# Patient Record
Sex: MALE | Race: WHITE | Employment: OTHER | URBAN - NONMETROPOLITAN AREA
[De-identification: names, ages, dates, MRNs, and addresses within clinical notes are randomized per-mention and may not be internally consistent; named-entity substitution may affect disease eponyms.]

---

## 2018-02-14 PROBLEM — G40.919 BREAKTHROUGH SEIZURE (HCC): Status: ACTIVE | Noted: 2018-02-14

## 2018-10-01 ENCOUNTER — HOSPITAL ENCOUNTER (EMERGENCY)
Age: 53
Discharge: HOME | End: 2018-10-01
Attending: EMERGENCY MEDICINE
Payer: COMMERCIAL

## 2018-10-01 VITALS
OXYGEN SATURATION: 99 % | HEART RATE: 55 BPM | RESPIRATION RATE: 16 BRPM | BODY MASS INDEX: 22.9 KG/M2 | WEIGHT: 160 LBS | TEMPERATURE: 98.4 F | DIASTOLIC BLOOD PRESSURE: 72 MMHG | HEIGHT: 70 IN | SYSTOLIC BLOOD PRESSURE: 131 MMHG

## 2018-10-01 DIAGNOSIS — S01.111A EYEBROW LACERATION, RIGHT, INITIAL ENCOUNTER: Primary | ICD-10-CM

## 2018-10-01 PROCEDURE — 4500000027

## 2018-10-01 PROCEDURE — 99283 EMERGENCY DEPT VISIT LOW MDM: CPT

## 2018-10-01 RX ORDER — FLUOXETINE HYDROCHLORIDE 20 MG/1
20 CAPSULE ORAL DAILY
COMMUNITY

## 2018-10-01 RX ORDER — AMOXICILLIN 500 MG
1 CAPSULE ORAL DAILY
COMMUNITY
End: 2022-01-12

## 2018-10-01 NOTE — ED PROVIDER NOTES
times daily (Patient taking differently: Take 500 mg by mouth 2 times daily ) 180 tablet 0    Multiple Vitamins-Minerals (MULTIVITAMIN MEN 50+) TABS Take 1 tablet by mouth daily      vitamin B-12 (CYANOCOBALAMIN) 500 MCG tablet Take 500 mcg by mouth daily      b complex vitamins capsule Take 1 capsule by mouth daily      lacosamide (VIMPAT) 200 MG tablet 1 tablet by PEG Tube route 2 times daily. (Patient taking differently: Take 200 mg by mouth 2 times daily ) 60 tablet 0     Allergies   Allergen Reactions    Zithromax [Azithromycin] Other (See Comments)     Pt. POA says it affected his kidneys but could not recall how      Advair Diskus [Fluticasone-Salmeterol] Other (See Comments)     Mother states causes SIEZURES    Albuterol Sulfate Other (See Comments)     Mother states it causes SIEZURES    Amoxicillin     Doxycycline     Lasix [Furosemide] Rash         ROS:    Review of Systems   Musculoskeletal: Negative for neck stiffness. Skin: Positive for wound. All other systems reviewed and are negative. Nursing Notes Reviewed    Physical Exam:  ED Triage Vitals   Enc Vitals Group      BP       Pulse       Resp       Temp       Temp src       SpO2       Weight       Height       Head Circumference       Peak Flow       Pain Score       Pain Loc       Pain Edu? Excl. in [address]? Physical Exam   Constitutional: He is oriented to person, place, and time. He appears well-developed and well-nourished. HENT:   Head: Normocephalic and atraumatic. Laceration right eyebrow 3 cm linear no devitalized tissue no foreign body   Eyes: Pupils are equal, round, and reactive to light. Neck: Normal range of motion. Neck supple. Cardiovascular: Normal rate, normal heart sounds and intact distal pulses. Pulmonary/Chest: Breath sounds normal. No respiratory distress. Abdominal: Soft. There is no tenderness. Musculoskeletal: Normal range of motion.         Cervical back: Normal. He exhibits normal range of motion, no tenderness and no bony tenderness. Neurological: He is alert and oriented to person, place, and time. Skin: Skin is warm and dry. Psychiatric: He has a normal mood and affect. Nursing note and vitals reviewed. I have reviewed and interpreted all of the currently available lab results from this visit (if applicable):  No results found for this visit on 10/01/18. Radiographs (if obtained):  [] The following radiograph was interpreted by myself in the absence of a radiologist:   [] Radiologist's Report Reviewed:  No orders to display         EKG (if obtained): (All EKG's are interpreted by myself in the absence of a cardiologist)    Chart review shows recent radiographs:  No results found. MDM:    Procedure Note - Laceration repair:  Questions were sought and answered and verbal consent was given by patient for the procedure. The area was prepped and draped in standard bedside fashion. The wound area was anesthetized with 3ml of Lidocaine 1% with epinephrine with added sodium bicarbonate. The wound was explored with No foreign bodies found. The wound was repaired with 6-0 Prolene; 3  sutures were used. The patient tolerated the procedure well without complications and my repeat neurovascular exam post-procedure is unchanged. Wound care and scar minimization education was provided. Instructions were given to return for increasing pain, redness, streaking, discharge, or any other worsening or worrisome concerns. Clinical Impression:  1. Eyebrow laceration, right, initial encounter      Disposition referral (if applicable):  No follow-up provider specified. Disposition medications (if applicable):  New Prescriptions    No medications on file           Bridger Mccollum DO, FACEP      Comment: Please note this report has been produced using speech recognition software and may contain errors related to that system including errors in grammar, punctuation, and spelling, as

## 2018-10-16 ENCOUNTER — APPOINTMENT (OUTPATIENT)
Dept: GENERAL RADIOLOGY | Age: 53
End: 2018-10-16
Payer: COMMERCIAL

## 2018-10-16 ENCOUNTER — APPOINTMENT (OUTPATIENT)
Dept: CT IMAGING | Age: 53
End: 2018-10-16
Payer: COMMERCIAL

## 2018-10-16 ENCOUNTER — HOSPITAL ENCOUNTER (EMERGENCY)
Age: 53
Discharge: HOME | End: 2018-10-16
Attending: EMERGENCY MEDICINE
Payer: COMMERCIAL

## 2018-10-16 VITALS
SYSTOLIC BLOOD PRESSURE: 141 MMHG | HEIGHT: 67 IN | RESPIRATION RATE: 16 BRPM | OXYGEN SATURATION: 83 % | WEIGHT: 160 LBS | TEMPERATURE: 98.3 F | BODY MASS INDEX: 25.11 KG/M2 | DIASTOLIC BLOOD PRESSURE: 84 MMHG | HEART RATE: 55 BPM

## 2018-10-16 DIAGNOSIS — R07.89 CHEST WALL PAIN: ICD-10-CM
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
## 2018-10-16 PROCEDURE — 99284 EMERGENCY DEPT VISIT MOD MDM: CPT

## 2018-10-16 PROCEDURE — 72125 CT NECK SPINE W/O DYE: CPT

## 2018-10-16 PROCEDURE — 70450 CT HEAD/BRAIN W/O DYE: CPT

## 2018-10-16 PROCEDURE — 71046 X-RAY EXAM CHEST 2 VIEWS: CPT

## 2018-10-16 ASSESSMENT — PAIN DESCRIPTION - PAIN TYPE: TYPE: ACUTE PAIN

## 2018-10-16 ASSESSMENT — PAIN DESCRIPTION - DESCRIPTORS: DESCRIPTORS: ACHING

## 2018-10-16 ASSESSMENT — PAIN SCALES - WONG BAKER: WONGBAKER_NUMERICALRESPONSE: 4

## 2018-10-16 ASSESSMENT — PAIN DESCRIPTION - LOCATION: LOCATION: BACK

## 2018-12-23 ENCOUNTER — APPOINTMENT (OUTPATIENT)
Dept: CT IMAGING | Age: 53
End: 2018-12-23
Payer: COMMERCIAL

## 2018-12-23 ENCOUNTER — HOSPITAL ENCOUNTER (EMERGENCY)
Age: 53
Discharge: HOME | End: 2018-12-23
Attending: EMERGENCY MEDICINE
Payer: COMMERCIAL

## 2018-12-23 VITALS
DIASTOLIC BLOOD PRESSURE: 78 MMHG | TEMPERATURE: 97.6 F | OXYGEN SATURATION: 99 % | BODY MASS INDEX: 22.87 KG/M2 | SYSTOLIC BLOOD PRESSURE: 133 MMHG | RESPIRATION RATE: 18 BRPM | HEART RATE: 79 BPM | WEIGHT: 146 LBS

## 2018-12-23 DIAGNOSIS — R56.9 SEIZURE (HCC): Primary | ICD-10-CM

## 2018-12-23 LAB
ALBUMIN SERPL-MCNC: 4 GM/DL (ref 3.4–5)
ALP BLD-CCNC: 65 IU/L (ref 40–129)
ALT SERPL-CCNC: 17 U/L (ref 10–40)
ANION GAP SERPL CALCULATED.3IONS-SCNC: 13 MMOL/L (ref 4–16)
AST SERPL-CCNC: 20 IU/L (ref 15–37)
BACTERIA: ABNORMAL /HPF
BASOPHILS ABSOLUTE: 0.1 K/CU MM
BASOPHILS RELATIVE PERCENT: 0.7 % (ref 0–1)
BILIRUB SERPL-MCNC: 0.2 MG/DL (ref 0–1)
BILIRUBIN URINE: NEGATIVE MG/DL
BLOOD, URINE: NEGATIVE
BUN BLDV-MCNC: 23 MG/DL (ref 6–23)
CALCIUM SERPL-MCNC: 9.4 MG/DL (ref 8.3–10.6)
CHLORIDE BLD-SCNC: 100 MMOL/L (ref 99–110)
CLARITY: CLEAR
CO2: 26 MMOL/L (ref 21–32)
COLOR: YELLOW
CREAT SERPL-MCNC: 0.9 MG/DL (ref 0.9–1.3)
DIFFERENTIAL TYPE: ABNORMAL
EOSINOPHILS ABSOLUTE: 0.1 K/CU MM
EOSINOPHILS RELATIVE PERCENT: 0.8 % (ref 0–3)
GFR AFRICAN AMERICAN: >60 ML/MIN/1.73M2
GFR NON-AFRICAN AMERICAN: >60 ML/MIN/1.73M2
GLUCOSE BLD-MCNC: 81 MG/DL (ref 70–99)
GLUCOSE, URINE: NEGATIVE MG/DL
HCT VFR BLD CALC: 41.5 % (ref 42–52)
HEMOGLOBIN: 13.3 GM/DL (ref 13.5–18)
IMMATURE NEUTROPHIL %: 0.5 % (ref 0–0.43)
KETONES, URINE: NEGATIVE MG/DL
LACTATE: 2.8 MMOL/L (ref 0.4–2)
LEUKOCYTE ESTERASE, URINE: NEGATIVE
LYMPHOCYTES ABSOLUTE: 1.4 K/CU MM
LYMPHOCYTES RELATIVE PERCENT: 19.4 % (ref 24–44)
MAGNESIUM: 2 MG/DL (ref 1.8–2.4)
MCH RBC QN AUTO: 29.4 PG (ref 27–31)
MCHC RBC AUTO-ENTMCNC: 32 % (ref 32–36)
MCV RBC AUTO: 91.8 FL (ref 78–100)
MONOCYTES ABSOLUTE: 0.6 K/CU MM
MONOCYTES RELATIVE PERCENT: 8.6 % (ref 0–4)
MUCUS: ABNORMAL HPF
NITRITE URINE, QUANTITATIVE: NEGATIVE
NUCLEATED RBC %: 0 %
PDW BLD-RTO: 14.2 % (ref 11.7–14.9)
PH, URINE: 7 (ref 5–8)
PLATELET # BLD: 265 K/CU MM (ref 140–440)
PMV BLD AUTO: 9.6 FL (ref 7.5–11.1)
POTASSIUM SERPL-SCNC: 4.3 MMOL/L (ref 3.5–5.1)
PROTEIN UA: 30 MG/DL
RBC # BLD: 4.52 M/CU MM (ref 4.6–6.2)
RBC URINE: 4 /HPF (ref 0–3)
SEGMENTED NEUTROPHILS ABSOLUTE COUNT: 5.1 K/CU MM
SEGMENTED NEUTROPHILS RELATIVE PERCENT: 70 % (ref 36–66)
SODIUM BLD-SCNC: 139 MMOL/L (ref 135–145)
SPECIFIC GRAVITY UA: 1.02 (ref 1–1.03)
SPERM: ABNORMAL /HFP
SQUAMOUS EPITHELIAL: <1 /HPF
TOTAL CK: 104 IU/L (ref 38–174)
TOTAL IMMATURE NEUTOROPHIL: 0.04 K/CU MM
TOTAL NUCLEATED RBC: 0 K/CU MM
TOTAL PROTEIN: 7.6 GM/DL (ref 6.4–8.2)
TRICHOMONAS: ABNORMAL /HPF
TSH HIGH SENSITIVITY: 8.12 UIU/ML (ref 0.27–4.2)
UROBILINOGEN, URINE: NORMAL MG/DL (ref 0.2–1)
VALPROIC ACID LEVEL: 62.2 UG/ML (ref 50–100)
WBC # BLD: 7.3 K/CU MM (ref 4–10.5)
WBC UA: <1 /HPF (ref 0–2)

## 2018-12-23 PROCEDURE — 2580000003 HC RX 258: Performed by: EMERGENCY MEDICINE

## 2018-12-23 PROCEDURE — 99284 EMERGENCY DEPT VISIT MOD MDM: CPT

## 2018-12-23 PROCEDURE — 93005 ELECTROCARDIOGRAM TRACING: CPT | Performed by: EMERGENCY MEDICINE

## 2018-12-23 PROCEDURE — 80164 ASSAY DIPROPYLACETIC ACD TOT: CPT

## 2018-12-23 PROCEDURE — 70450 CT HEAD/BRAIN W/O DYE: CPT

## 2018-12-23 PROCEDURE — 93010 ELECTROCARDIOGRAM REPORT: CPT | Performed by: INTERNAL MEDICINE

## 2018-12-23 PROCEDURE — 83735 ASSAY OF MAGNESIUM: CPT

## 2018-12-23 PROCEDURE — 85025 COMPLETE CBC W/AUTO DIFF WBC: CPT

## 2018-12-23 PROCEDURE — 84443 ASSAY THYROID STIM HORMONE: CPT

## 2018-12-23 PROCEDURE — 82550 ASSAY OF CK (CPK): CPT

## 2018-12-23 PROCEDURE — 80053 COMPREHEN METABOLIC PANEL: CPT

## 2018-12-23 PROCEDURE — 83605 ASSAY OF LACTIC ACID: CPT

## 2018-12-23 PROCEDURE — 81001 URINALYSIS AUTO W/SCOPE: CPT

## 2018-12-23 RX ORDER — 0.9 % SODIUM CHLORIDE 0.9 %
1000 INTRAVENOUS SOLUTION INTRAVENOUS ONCE
Status: COMPLETED | OUTPATIENT
Start: 2018-12-23 | End: 2018-12-23

## 2018-12-23 RX ADMIN — SODIUM CHLORIDE 1000 ML: 9 INJECTION, SOLUTION INTRAVENOUS at 07:10

## 2018-12-23 ASSESSMENT — ENCOUNTER SYMPTOMS
GASTROINTESTINAL NEGATIVE: 1
RESPIRATORY NEGATIVE: 1
EYES NEGATIVE: 1
ALLERGIC/IMMUNOLOGIC NEGATIVE: 1

## 2018-12-23 NOTE — ED NOTES
Discharge instructions and follow up care reviewed with the patient and parents. Questions addressed. [facility]  12/23/18 3077

## 2018-12-23 NOTE — ED PROVIDER NOTES
brain    EKG (if obtained): (All EKG's are interpreted by myself in the absence of a cardiologist)    12 lead EKG per my interpretation:  Normal Sinus Rhythm 78  Axis is   Normal  QTc is  389  There is no specific T wave changes appreciated. There is no specific ST wave changes appreciated. Prior EKG to compare with was not available       MDM:    Patient isn't complaining of seizure. Again he has a history of seizures does get breakthrough seizures she's had 3 since last night lasting about a minute each generalized tonic-clonic. He appears well my examination vital signs are stable he has no complaints denies any signs of trauma. Labs are negative imaging is negative. Patient does take Vimpat and Depakote. I did order a Depakote level back today. He does see neurology. I advised very strict return precautions and follow-up information. Family patient okay going home discharge given instructions. EKG negative.     CLINICAL IMPRESSION:  Final diagnoses:   Seizure ([address]       (Please note that portions of this note may have been completed with a voice recognition program. Efforts were made to edit the dictations but occasionally words aremis-transcribed.)    DISPOSITION REFERRAL (if applicable):  Sabrina Jordan MD  [address] [place] [id number]  [phone]    In 1 day      [facility] Emergency Department  [place] [id number] [phone]    If symptoms worsen    Nicole Lozoya MD  [facility] [address] [place] [address]  [phone]    Schedule an appointment as soon as possible for a visit in 1 day        DISPOSITION MEDICATIONS (if applicable):  New Prescriptions    No medications on file          Walter Rizo, [facility],   12/23/18 4974

## 2018-12-23 NOTE — ED NOTES
This nurse spoke with Dr Jeannie Vásquez about IV fluids. IV fluids stopped due to discharge. Only 100ml infused. Janet Alcantara, [facility]  12/23/18 3242

## 2018-12-28 LAB
EKG ATRIAL RATE: 78 BPM
EKG DIAGNOSIS: NORMAL
EKG P AXIS: 61 DEGREES
EKG P-R INTERVAL: 150 MS
EKG Q-T INTERVAL: 342 MS
EKG QRS DURATION: 86 MS
EKG QTC CALCULATION (BAZETT): 389 MS
EKG R AXIS: 42 DEGREES
EKG T AXIS: 72 DEGREES
EKG VENTRICULAR RATE: 78 BPM

## 2019-05-20 ENCOUNTER — HOSPITAL ENCOUNTER (EMERGENCY)
Age: 54
Discharge: HOME | End: 2019-05-20
Attending: EMERGENCY MEDICINE
Payer: COMMERCIAL

## 2019-05-20 VITALS
RESPIRATION RATE: 20 BRPM | WEIGHT: 150 LBS | OXYGEN SATURATION: 95 % | HEART RATE: 67 BPM | DIASTOLIC BLOOD PRESSURE: 83 MMHG | TEMPERATURE: 96.7 F | BODY MASS INDEX: 22.73 KG/M2 | HEIGHT: 68 IN | SYSTOLIC BLOOD PRESSURE: 134 MMHG

## 2019-05-20 DIAGNOSIS — S01.81XA FACIAL LACERATION, INITIAL ENCOUNTER: Primary | ICD-10-CM

## 2019-05-20 PROCEDURE — 99283 EMERGENCY DEPT VISIT LOW MDM: CPT

## 2019-05-20 PROCEDURE — 4500000027

## 2019-05-20 PROCEDURE — 6370000000 HC RX 637 (ALT 250 FOR IP)

## 2019-05-20 RX ORDER — LACOSAMIDE 200 MG/1
TABLET ORAL
COMMUNITY
End: 2019-05-20 | Stop reason: ALTCHOICE

## 2019-05-20 RX ORDER — DIAPER,BRIEF,INFANT-TODD,DISP
EACH MISCELLANEOUS
Status: COMPLETED
Start: 2019-05-20 | End: 2019-05-20

## 2019-05-20 RX ORDER — DONEPEZIL HYDROCHLORIDE 5 MG/1
5 TABLET, FILM COATED ORAL NIGHTLY
COMMUNITY
End: 2022-01-12

## 2019-05-20 RX ADMIN — BACITRACIN ZINC 1 G: 500 OINTMENT TOPICAL at 16:00
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
## 2019-05-20 NOTE — ED NOTES
Laceration sutured by Dr Jesse Arrington. Pt tolerated well.  Male friend remained at bedside     Becca Honeycutt, [facility]  05/20/19 5819

## 2019-05-20 NOTE — ED NOTES
Discharge instructions given to pt and male friend. Instructed to follow up with his family dr and to return to ER if any problems or concerns. Male friend verbalizes understanding.  Pt discharged ambulatory with male friend     Promise Nguyen RN  05/20/19 5898

## 2019-06-23 ENCOUNTER — ANESTHESIA EVENT (OUTPATIENT)
Dept: OPERATING ROOM | Age: 54
End: 2019-06-23
Payer: MEDICARE

## 2019-06-23 ASSESSMENT — COPD QUESTIONNAIRES: CAT_SEVERITY: MODERATE
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
## 2019-06-24 NOTE — PROGRESS NOTES
1. Hx of anesthesia complications? --no  2. Hx of difficult airway/intubation? --no  3. Hx of changed chest pain/CHF exacerbation in last 6 mo. ? --no  4. Home O2 dependent? --no  5. Able to climb one flight of stairs w/o SOB? -- yes  6.  Recent COPD exacerbation or pneumonia/bronchitis that has been treated in last      month? --no

## 2019-06-25 ENCOUNTER — HOSPITAL ENCOUNTER (OUTPATIENT)
Age: 54
Setting detail: OUTPATIENT SURGERY
Discharge: HOME | End: 2019-06-25
Attending: INTERNAL MEDICINE | Admitting: INTERNAL MEDICINE
Payer: MEDICARE

## 2019-06-25 ENCOUNTER — ANESTHESIA (OUTPATIENT)
Dept: OPERATING ROOM | Age: 54
End: 2019-06-25
Payer: MEDICARE

## 2019-06-25 VITALS
RESPIRATION RATE: 16 BRPM | DIASTOLIC BLOOD PRESSURE: 83 MMHG | TEMPERATURE: 97.2 F | OXYGEN SATURATION: 97 % | WEIGHT: 165 LBS | SYSTOLIC BLOOD PRESSURE: 114 MMHG | HEIGHT: 66 IN | BODY MASS INDEX: 26.52 KG/M2 | HEART RATE: 58 BPM

## 2019-06-25 VITALS — OXYGEN SATURATION: 95 % | DIASTOLIC BLOOD PRESSURE: 95 MMHG | SYSTOLIC BLOOD PRESSURE: 122 MMHG

## 2019-06-25 PROCEDURE — 3700000001 HC ADD 15 MINUTES (ANESTHESIA): Performed by: INTERNAL MEDICINE

## 2019-06-25 PROCEDURE — 6360000002 HC RX W HCPCS: Performed by: NURSE ANESTHETIST, CERTIFIED REGISTERED

## 2019-06-25 PROCEDURE — 3700000000 HC ANESTHESIA ATTENDED CARE: Performed by: INTERNAL MEDICINE

## 2019-06-25 PROCEDURE — 2500000003 HC RX 250 WO HCPCS: Performed by: NURSE ANESTHETIST, CERTIFIED REGISTERED

## 2019-06-25 PROCEDURE — 7100000010 HC PHASE II RECOVERY - FIRST 15 MIN: Performed by: INTERNAL MEDICINE

## 2019-06-25 PROCEDURE — 2709999900 HC NON-CHARGEABLE SUPPLY: Performed by: INTERNAL MEDICINE

## 2019-06-25 PROCEDURE — 2580000003 HC RX 258: Performed by: INTERNAL MEDICINE

## 2019-06-25 PROCEDURE — 7100000011 HC PHASE II RECOVERY - ADDTL 15 MIN: Performed by: INTERNAL MEDICINE

## 2019-06-25 PROCEDURE — 3609012800 HC EGD DIAGNOSTIC ONLY: Performed by: INTERNAL MEDICINE

## 2019-06-25 RX ORDER — SODIUM CHLORIDE, SODIUM LACTATE, POTASSIUM CHLORIDE, CALCIUM CHLORIDE 600; 310; 30; 20 MG/100ML; MG/100ML; MG/100ML; MG/100ML
INJECTION, SOLUTION INTRAVENOUS CONTINUOUS
Status: DISCONTINUED | OUTPATIENT
Start: 2019-06-25 | End: 2019-06-25 | Stop reason: HOSPADM

## 2019-06-25 RX ORDER — LIDOCAINE HYDROCHLORIDE 20 MG/ML
INJECTION, SOLUTION EPIDURAL; INFILTRATION; INTRACAUDAL; PERINEURAL PRN
Status: DISCONTINUED | OUTPATIENT
Start: 2019-06-25 | End: 2019-06-25 | Stop reason: SDUPTHER

## 2019-06-25 RX ORDER — PROPOFOL 10 MG/ML
INJECTION, EMULSION INTRAVENOUS PRN
Status: DISCONTINUED | OUTPATIENT
Start: 2019-06-25 | End: 2019-06-25 | Stop reason: SDUPTHER

## 2019-06-25 RX ADMIN — SODIUM CHLORIDE, POTASSIUM CHLORIDE, SODIUM LACTATE AND CALCIUM CHLORIDE: 600; 310; 30; 20 INJECTION, SOLUTION INTRAVENOUS at 07:32

## 2019-06-25 RX ADMIN — PROPOFOL 180 MG: 10 INJECTION, EMULSION INTRAVENOUS at 08:02

## 2019-06-25 RX ADMIN — LIDOCAINE HYDROCHLORIDE 120 MG: 20 INJECTION, SOLUTION EPIDURAL; INFILTRATION; INTRACAUDAL; PERINEURAL at 08:02

## 2019-06-25 ASSESSMENT — PAIN SCALES - GENERAL
PAINLEVEL_OUTOF10: 0
PAINLEVEL_OUTOF10: 0

## 2019-06-25 ASSESSMENT — PAIN - FUNCTIONAL ASSESSMENT: PAIN_FUNCTIONAL_ASSESSMENT: 0-10

## 2019-06-25 NOTE — BRIEF OP NOTE
Brief Postoperative Note  ______________________________________________________________    Patient: Jing Worley  YOB: 1965  MRN: [id number]  Date of Procedure: 6/25/2019    Pre-Op Diagnosis: Dysphagia, Hiatal Hernia    Post-Op Diagnosis: Same no stricture       Procedure(s):  EGD DIAGNOSTIC ONLY    Anesthesia: General, Monitor Anesthesia Care, TIVA    Surgeon(s):  Irving Munguia MD    Estimated Blood Loss (mL): less than 50     Complications: None                    Irving Munguia MD  Date: 6/25/2019  Time: 8:13 AM

## 2019-06-25 NOTE — PROGRESS NOTES
7463 Patient transferred from GI lab to [facility] via cart, his father is at bedside, patient is resting quietly with his eyes closed. 7965 Patient is wake and is sitting up drinking a pepsi. 0840 Patient and his father given home instructions. 0900 Patient discharged to home, his father will drive him.

## 2019-06-25 NOTE — ANESTHESIA POSTPROCEDURE EVALUATION
Department of Anesthesiology  Postprocedure Note    Patient: Ellen Troy  MRN: [id number]  YOB: 1965  Date of evaluation: 6/25/2019  Time:  8:14 AM     Procedure Summary     Date:  06/25/19 Room / Location:  [place] [address] 04 / 1200 [facility] OR    Anesthesia Start:  1994 Anesthesia Stop:  7276    Procedure:  EGD DIAGNOSTIC ONLY (N/A ) Diagnosis:  (Dysphagia, Hiatal Hernia)    Surgeon:  Edmundo Pisano MD Responsible Provider:  Balinda Lesches, APRN - CRNA    Anesthesia Type:  general, MAC, TIVA ASA Status:  3          Anesthesia Type: general, MAC, TIVA    Nilesh Phase I:      Nilesh Phase II:      Last vitals: Reviewed and per EMR flowsheets.        Anesthesia Post Evaluation    Patient location during evaluation: bedside  Patient participation: complete - patient participated  Level of consciousness: awake and alert  Pain score: 0  Airway patency: patent  Nausea & Vomiting: no nausea and no vomiting  Complications: no  Cardiovascular status: blood pressure returned to baseline  Respiratory status: acceptable, nonlabored ventilation, spontaneous ventilation and room air  Hydration status: euvolemic

## 2019-10-25 ENCOUNTER — APPOINTMENT (OUTPATIENT)
Dept: CT IMAGING | Age: 54
End: 2019-10-25
Payer: MEDICARE

## 2019-10-25 ENCOUNTER — HOSPITAL ENCOUNTER (EMERGENCY)
Age: 54
Discharge: HOME | End: 2019-10-25
Attending: EMERGENCY MEDICINE
Payer: MEDICARE

## 2019-10-25 VITALS
SYSTOLIC BLOOD PRESSURE: 138 MMHG | TEMPERATURE: 97.1 F | HEART RATE: 78 BPM | WEIGHT: 162 LBS | OXYGEN SATURATION: 99 % | BODY MASS INDEX: 26.03 KG/M2 | DIASTOLIC BLOOD PRESSURE: 70 MMHG | HEIGHT: 66 IN | RESPIRATION RATE: 25 BRPM

## 2019-10-25 DIAGNOSIS — R56.9 SEIZURE (HCC): Primary | ICD-10-CM

## 2019-10-25 LAB
ALBUMIN SERPL-MCNC: 4.1 GM/DL (ref 3.4–5)
ALP BLD-CCNC: 54 IU/L (ref 40–129)
ALT SERPL-CCNC: 19 U/L (ref 10–40)
ANION GAP SERPL CALCULATED.3IONS-SCNC: 11 MMOL/L (ref 4–16)
AST SERPL-CCNC: 23 IU/L (ref 15–37)
BASOPHILS ABSOLUTE: 0.1 K/CU MM
BASOPHILS RELATIVE PERCENT: 0.4 % (ref 0–1)
BILIRUB SERPL-MCNC: 0.2 MG/DL (ref 0–1)
BUN BLDV-MCNC: 21 MG/DL (ref 6–23)
CALCIUM SERPL-MCNC: 9.6 MG/DL (ref 8.3–10.6)
CHLORIDE BLD-SCNC: 97 MMOL/L (ref 99–110)
CO2: 27 MMOL/L (ref 21–32)
CREAT SERPL-MCNC: 0.9 MG/DL (ref 0.9–1.3)
DIFFERENTIAL TYPE: ABNORMAL
EOSINOPHILS ABSOLUTE: 0 K/CU MM
EOSINOPHILS RELATIVE PERCENT: 0 % (ref 0–3)
GFR AFRICAN AMERICAN: >60 ML/MIN/1.73M2
GFR NON-AFRICAN AMERICAN: >60 ML/MIN/1.73M2
GLUCOSE BLD-MCNC: 105 MG/DL (ref 70–99)
HCT VFR BLD CALC: 44.2 % (ref 42–52)
HEMOGLOBIN: 14.4 GM/DL (ref 13.5–18)
IMMATURE NEUTROPHIL %: 0.3 % (ref 0–0.43)
LYMPHOCYTES ABSOLUTE: 1.7 K/CU MM
LYMPHOCYTES RELATIVE PERCENT: 12.4 % (ref 24–44)
MCH RBC QN AUTO: 30.3 PG (ref 27–31)
MCHC RBC AUTO-ENTMCNC: 32.6 % (ref 32–36)
MCV RBC AUTO: 92.9 FL (ref 78–100)
MONOCYTES ABSOLUTE: 1 K/CU MM
MONOCYTES RELATIVE PERCENT: 7.5 % (ref 0–4)
NUCLEATED RBC %: 0 %
PDW BLD-RTO: 13.2 % (ref 11.7–14.9)
PLATELET # BLD: 290 K/CU MM (ref 140–440)
PMV BLD AUTO: 9.9 FL (ref 7.5–11.1)
POTASSIUM SERPL-SCNC: 3.9 MMOL/L (ref 3.5–5.1)
RBC # BLD: 4.76 M/CU MM (ref 4.6–6.2)
SEGMENTED NEUTROPHILS ABSOLUTE COUNT: 10.6 K/CU MM
SEGMENTED NEUTROPHILS RELATIVE PERCENT: 79.4 % (ref 36–66)
SODIUM BLD-SCNC: 135 MMOL/L (ref 135–145)
TOTAL IMMATURE NEUTOROPHIL: 0.04 K/CU MM
TOTAL NUCLEATED RBC: 0 K/CU MM
TOTAL PROTEIN: 7.4 GM/DL (ref 6.4–8.2)
WBC # BLD: 13.4 K/CU MM (ref 4–10.5)

## 2019-10-25 PROCEDURE — 85025 COMPLETE CBC W/AUTO DIFF WBC: CPT

## 2019-10-25 PROCEDURE — 36415 COLL VENOUS BLD VENIPUNCTURE: CPT

## 2019-10-25 PROCEDURE — 6360000002 HC RX W HCPCS: Performed by: EMERGENCY MEDICINE

## 2019-10-25 PROCEDURE — 99284 EMERGENCY DEPT VISIT MOD MDM: CPT

## 2019-10-25 PROCEDURE — 96374 THER/PROPH/DIAG INJ IV PUSH: CPT

## 2019-10-25 PROCEDURE — 80165 DIPROPYLACETIC ACID FREE: CPT

## 2019-10-25 PROCEDURE — 70450 CT HEAD/BRAIN W/O DYE: CPT

## 2019-10-25 PROCEDURE — 80164 ASSAY DIPROPYLACETIC ACD TOT: CPT

## 2019-10-25 PROCEDURE — 80053 COMPREHEN METABOLIC PANEL: CPT

## 2019-10-25 RX ORDER — LORAZEPAM 2 MG/ML
1 INJECTION INTRAMUSCULAR ONCE
Status: COMPLETED | OUTPATIENT
Start: 2019-10-25 | End: 2019-10-25

## 2019-10-25 RX ORDER — DIVALPROEX SODIUM 250 MG/1
250 TABLET, EXTENDED RELEASE ORAL ONCE
Status: DISCONTINUED | OUTPATIENT
Start: 2019-10-25 | End: 2019-10-25

## 2019-10-25 RX ADMIN — LORAZEPAM 1 MG: 2 INJECTION, SOLUTION INTRAMUSCULAR; INTRAVENOUS at 17:18

## 2019-10-28 LAB
VALPROIC ACID % FREE: 20 % (ref 5–18)
VALPROIC ACID % FREE: ABNORMAL % (ref 5–18)
VALPROIC ACID TOTAL: 69 UG/ML (ref 50–125)
VALPROIC ACID, FREE: 14 UG/ML (ref 7–23)

## 2020-09-28 ENCOUNTER — HOSPITAL ENCOUNTER (OUTPATIENT)
Dept: SPEECH THERAPY | Age: 55
Setting detail: THERAPIES SERIES
Discharge: HOME | End: 2020-09-28
Payer: MEDICARE

## 2020-09-28 PROCEDURE — 92523 SPEECH SOUND LANG COMPREHEN: CPT
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
## 2020-09-28 NOTE — PLAN OF CARE
skills for functional daily information for everyday activities             Frequency/Duration:  # Days per week: [x] 1 day # Weeks: [] 1 week [] 5 weeks     [] 2 days? [] 2 weeks [] 6 weeks     [] 3 days   [] 3 weeks [] 7 weeks     [] 4 days   [] 4 weeks [x] 8 weeks         [] 9 weeks [] 10 weeks         [] 11 weeks [] 12 weeks    Rehab Potential: [] Excellent [x] Good [] Fair  [] Poor         Electronically signed by:   Adolfo Arredondo. MS Asad, CCC-SLP  Speech/Language Pathologist  9/28/2020  4:05 PM    If you have any questions or concerns, please don't hesitate to call.   Thank you for your referral.      Physician Signature:__________________Date:___________ Time: __________  By signing above, therapists plan is approved by physician

## 2020-09-29 NOTE — FLOWSHEET NOTE
Patients Plan of Care was received and signed. Signed POC was scanned and placed in the patients chart.     Eveline Schultz

## 2020-10-05 ENCOUNTER — HOSPITAL ENCOUNTER (OUTPATIENT)
Dept: SPEECH THERAPY | Age: 55
Setting detail: THERAPIES SERIES
Discharge: HOME | End: 2020-10-05
Payer: MEDICARE

## 2020-10-05 PROCEDURE — 92507 TX SP LANG VOICE COMM INDIV: CPT

## 2020-10-05 NOTE — FLOWSHEET NOTE
[x][facility] [address]      [facility] [address] Dept          Outpatient Rehab Dept     [address]       [address], [address], [address]       [address]     [phone] [phone] [phone]  [][place] [address]           Outpatient Speech Dept. [address] [facility], [address] [facility]           [phone] [phone]    Speech and Language Pathology Daily Note      Patient Name:  Angella Shipman    :  1965  Restrictions/Precautions:    Diagnosis/ICD 10 Dx Code from Physician:  Poor short term memory   Treatment Diagnosis/ICD10 for ST: cognitive communication deficit N41.002  Insurance/Certification information: BC/BS [facility]  Referring Physician:    Ashly Bartholomew PA-C     Plan of care signed (Y/N):  yes       Treatment Provided: speech    Date 10-5-2020    Time in/Time out 11:32AM-12:02PM    Total Timed Code Min 0    Total Treatment Minutes 30 minutes    Units  G Code applied: 1 speech    Subjective     Alert and cooperative    Pain level: No pain    Visit# / total visits:      GOALS:     Goal 1: Will employ a communication board system to establish topic when Reji Camacho is unable to retrieval the message he wishes to communicate. Milton's mother had written down the target vocabulary that Lulu Rodriguez uses each day. She also selected 31 vocabulary words/pictures from a communication symbols/word book that this therapist provided to her during this session. Goal 2: His mother will demonstrate use of the Spaced Retrieval Strategy to facilitate short term memory skills for functional daily information for everyday activities  The [place] Cognitive Assessment (MoCA)  Version 7.1 was administered 10/5/2020 as a rapid screening instrument for cognitive dysfunction.  It assesses the cognitive domains of  attention and concentration, executive functions, memory, language, visuoconstructional skills, conceptual thinking, calculations, and orientation. Yessy Paredes attained a score of 3 which indicates a  severe cognitive impairment. He demonstrated the ability to draw a [place] in the clock drawing portion and he stated [place]\" for the city in which he lives. He received no points for the trail making task, naming, memory, attention, language (repetition of sentences, word fluency), abstraction and delayed recall. He was unable to state the remaining aspects of orientation and write the numbers and hands on the clock. Did not address the Spaced Retrieval Strategy today                   Effective Strategies that Improve fx: Use of memory strategies to facilitate recall and use of cues to facilitate word retrieval skills    Barriers to progress: Cognitive impairments    Education completed:     Goals of treatment, plan of care, development of communication book and results of MOCA were discussed with Milton's mother who verbalized understanding    Home Exercise Plan: Mother will continue to write down vocabulary words that are meaningful for Yessy Paredes so that they can be included in print and picture in the communication book.       Objective Findings:  See above    Interventions used this date:  [x] Speech Treatment       [x] Instruction in HEP  [] Group   [] Dysphagia Treatment   [] Cognitive Skill Darren  [] Motor  [] Voice Treatment       []  AAC  Other:      Communication with other providers: none    Adverse Reactions to treatment: none     Treatment/Activity Tolerance:     [x]  Patient tolerated treatment well []  Patient limited by fatique    []  Patient limited by pain []  Patient limited by other medical complications   []  Other:     Patient Requires Follow-up:  []  Yes  []  No    Plan: [x]  Continue per plan of care []  Alter current plan (see comments)   [x]  Plan of care initiated []  Hold pending MD visit [] Discharge    Plan for Next Session:  Continue plan of care    Electronically signed by:   Orlando Corona.  MS Asad, CCC-SLP  Speech/Language Pathologist  10/5/2020  12:39 PM

## 2020-10-12 ENCOUNTER — HOSPITAL ENCOUNTER (OUTPATIENT)
Dept: SPEECH THERAPY | Age: 55
Setting detail: THERAPIES SERIES
Discharge: HOME | End: 2020-10-12
Payer: MEDICARE

## 2020-10-12 PROCEDURE — 92507 TX SP LANG VOICE COMM INDIV: CPT

## 2020-10-12 NOTE — FLOWSHEET NOTE
[x][facility] [address]      [facility] [address] Dept          Outpatient Rehab Dept     [address]       [facility], [address], [address]       [address]     [phone] [phone] [phone]  [][facility] [address]           Outpatient Speech Dept. [address] [address]           [phone] [phone]    Speech and Language Pathology Daily Note      Patient Name:  Keisha Jarvis    :  1965  Restrictions/Precautions:    Diagnosis/ICD 10 Dx Code from Physician:  Poor short term memory   Treatment Diagnosis/ICD10 for ST: cognitive communication deficit F19.348  Insurance/Certification information: BC/SAJAN [facility]- 10 sessions authorized  Referring Physician:    Bruna Ruiz PA-C     Plan of care signed (Y/N):  yes       Treatment Provided: speech    Date 10-5-2020 10-   Time in/Time out 11:32AM-12:02PM 11:17-11:54AM   Total Timed Code Min 0  0   Total Treatment Minutes 30 minutes 37 minutes   Units  G Code applied: 1 speech 1 speech   Subjective     Alert and cooperative Alert and cooperative    Pain level: No pain No pain   Visit# / total visits:   2   GOALS:     Goal 1: Will employ a communication board system to establish topic when Tracey Liu is unable to retrieval the message he wishes to communicate. Milton's mother had written down the target vocabulary that Kaylah Sung uses each day. She also selected 31 vocabulary words/pictures from a communication symbols/word book that this therapist provided to her during this session. Provided Neo's mother with pictures of some of the target vocabulary that she identified as functional for Filiberto conklin. She had assembled functional vocabulary in photos and/or color pictures into the booklet provided by this therapist last week.  She reported that she has been using this book with Mauricio Quiles to establish topic and to talk about topics of interest to him. She indicated that the communication book has been helpful for communication with him. Provided instruction regarding use of a one page tool that has vocabulary that are related such as toothbrush, toothpaste, razor,bathroom arranged close together on the page. She reported that she will use the vocabulary pictures to assemble a tool for communication. Eduard generated several sentences during this session when he saw the target pictures. Informed his mother that enhanced communication is the intent of the communication book. Goal 2: His mother will demonstrate use of the Spaced Retrieval Strategy to facilitate short term memory skills for functional daily information for everyday activities  The [place] Cognitive Assessment (MoCA)  Version 7.1 was administered 10/12/2020 as a rapid screening instrument for cognitive dysfunction. It assesses the cognitive domains of  attention and concentration, executive functions, memory, language, visuoconstructional skills, conceptual thinking, calculations, and orientation. Kaylah Sung attained a score of 3 which indicates a  severe cognitive impairment. He demonstrated the ability to draw a [place] in the clock drawing portion and he stated [place]\" for the city in which he lives. He received no points for the trail making task, naming, memory, attention, language (repetition of sentences, word fluency), abstraction and delayed recall. He was unable to state the remaining aspects of orientation and write the numbers and hands on the clock. Did not address the Spaced Retrieval Strategy today                Provided instruction and a handout regarding the Spaced Retrieval Strategy. Within the session, Tracey Liu was able to generate with cues, a task that he was going to assist his mother with after this session- \"take recycles to bin\".  At the one minute and two minute intervals he was able to recall the task. His mother indicated that she will employ this strategy during the next week to increase recall of functional information. Effective Strategies that Improve fx: Use of memory strategies to facilitate recall and use of cues to facilitate word retrieval skills Use of memory strategies to facilitate recall and use of cues to facilitate word retrieval skills. Use of communication book to establish topic and facilitate word retrieval   Barriers to progress: Cognitive impairments Cognitive impairments and hearing loss   Education completed:     Goals of treatment, plan of care, development of communication book and results of MOCA were discussed with Milton's mother who verbalized understanding Goals of treatment, plan of care, development and use of communication book were discussed with Jocelyn Latonya and his mother who verbalized understanding   Home Exercise Plan: Mother will continue to write down vocabulary words that are meaningful for Juan Cummings so that they can be included in print and picture in the communication book. Cedric's mother will employ the communication book developed today with Jocelyn Hanks and add functional vocabulary to the [employer] and book.      Objective Findings:  See above    Interventions used this date:  [x] Speech Treatment       [x] Instruction in HEP  [] Group   [] Dysphagia Treatment   [] Cognitive Skill Darren  [] Motor  [] Voice Treatment       []  AAC  Other:      Communication with other providers: none    Adverse Reactions to treatment: none     Treatment/Activity Tolerance:     [x]  Patient tolerated treatment well []  Patient limited by fatique    []  Patient limited by pain []  Patient limited by other medical complications   []  Other:     Patient Requires Follow-up:  []  Yes  []  No    Plan: [x]  Continue per plan of care []  Alter current plan (see comments)   [x]  Plan of care initiated []  Hold pending MD visit []  Discharge    Plan for Next Session:  Continue plan of care    Electronically signed by:   Gertrude Ruiz.  MS Asad, CCC-SLP  Speech/Language Pathologist  10/12/2020  12:18 PM

## 2020-10-19 ENCOUNTER — HOSPITAL ENCOUNTER (OUTPATIENT)
Dept: SPEECH THERAPY | Age: 55
Setting detail: THERAPIES SERIES
Discharge: HOME | End: 2020-10-19
Payer: MEDICARE

## 2020-10-19 PROCEDURE — 92507 TX SP LANG VOICE COMM INDIV: CPT

## 2020-10-19 NOTE — FLOWSHEET NOTE
[x][facility] Shawnarânio Junqueira 1460      [place] [facility] [address] Dept          Outpatient Rehab Dept     [address]       [facility] [place] [address], [address], [address]       Maria G Marroquin 61     [phone] [phone] [phone]  [][facility] Duckworthio Junqueira 1460           Outpatient Speech Dept. [address] [facility], [address] [facility],Third Floor           [phone] [phone]    Speech and Language Pathology Daily Note      Patient Name:  Horacio Feliz    :  1965  Restrictions/Precautions:    Diagnosis/ICD 10 Dx Code from Physician:  Poor short term memory   Treatment Diagnosis/ICD10 for ST: cognitive communication deficit F12.346  Insurance/Certification information: BC[facility]SAJAN Manning[facility]  Referring Physician:    Mayra Burns PA-C     Plan of care signed (Y/N):  yes       Treatment Provided: speech    Date 10-5-2020 10- 10-   Time in/Time out 11:32AM-12:02PM 11:17-11:54AM 11:33-12:00PM   Total Timed Code Min 0  0 0   Total Treatment Minutes 30 minutes 37 minutes   27 minutes   Units  G Code applied: 1 speech 1 speech 1 speech   Subjective     Alert and cooperative Alert and cooperative  Alert and cooperative   Pain level: No pain No pain No pain   Visit# / total visits:  /    2/2 33   GOALS:      Goal 1: Will employ a communication board system to establish topic when Melvin Russo is unable to retrieval the message he wishes to communicate. Milton's mother had written down the target vocabulary that Celio Farshad uses each day. She also selected 31 vocabulary words/pictures from a communication symbols/word book that this therapist provided to her during this session. Provided Eduard's mother with pictures of some of the target vocabulary that she identified as functional for Ramila.  She had assembled functional vocabulary in photos and/or color pictures into the booklet provided by this therapist last week. She reported that she has been using this book with Mauricio Quiles to establish topic and to talk about topics of interest to him. She indicated that the communication book has been helpful for communication with him. Provided instruction regarding use of a one page tool that has vocabulary that are related such as toothbrush, toothpaste, razor,bathroom arranged close together on the page. She reported that she will use the vocabulary pictures to assemble a tool for communication. Eduard generated several sentences during this session when he saw the target pictures. Informed his mother that enhanced communication is the intent of the communication book. Milton's mother reported that she has been using the communication book with Kaylah Sung and helping him become familiar with the contents of the communication book. She reported that he has independently opened the communication book on a couple of occasions to establish his topic when he was having difficulty communicating. Provided additional pictures to Milton's mother to include in the book. His mother reported. \"he's carrying on a conversation better\" and \" He's thinking of the words he wants to say better\". Modeled ways to use the communication book to facilitate communication when Kaylah Sung has difficulty. Kaylah Sung initiated pointing to target words when a situation was presented to him such as \"you need more towels at your house\". His mother reported, \"You've given us some good ideas and they are working. GOAL MET   Goal 2: His mother will demonstrate use of the Spaced Retrieval Strategy to facilitate short term memory skills for functional daily information for everyday activities  The Mark Cognitive Assessment (MoCA)  Version 7.1 was administered on 10-5-2020 as a rapid screening instrument for cognitive dysfunction.  It assesses the cognitive domains of  attention and concentration, executive functions, memory, language, visuoconstructional skills, conceptual thinking, calculations, and orientation. Fallon Adhikari attained a score of 3 which indicates a  severe cognitive impairment. He demonstrated the ability to draw a [place] in the clock drawing portion and he stated [place]\" for the city in which he lives. He received no points for the trail making task, naming, memory, attention, language (repetition of sentences, word fluency), abstraction and delayed recall. He was unable to state the remaining aspects of orientation and write the numbers and hands on the clock. Did not address the Spaced Retrieval Strategy today                Provided instruction and a handout regarding the Spaced Retrieval Strategy. Within the session, Guillermo Ford was able to generate with cues, a task that he was going to assist his mother with after this session- \"take recycles to bin\". At the one minute and two minute intervals he was able to recall the task. His mother indicated that she will employ this strategy during the next week to increase recall of functional information. Milton's mother reported that she has been employing this strategy to facilitate Milton's recall of important information. She indicated that this strategy has been assisting Fallon Adhikari in recall of functional daily information. GOAL MET      Effective Strategies that Improve fx: Use of memory strategies to facilitate recall and use of cues to facilitate word retrieval skills Use of memory strategies to facilitate recall and use of cues to facilitate word retrieval skills. Use of communication book to establish topic and facilitate word retrieval Use of memory strategies to facilitate recall and use of cues to facilitate word retrieval skills.  Use of communication book to establish topic and facilitate word retrieval   Barriers to progress: Cognitive impairments Cognitive impairments and hearing loss Cognitive impairments and hearing loss   Education completed:     Goals of treatment, plan of care, development of communication book and results of MOCA were discussed with Milton's mother who verbalized understanding Goals of treatment, plan of care, development and use of communication book were discussed with Wesly Madsen and his mother who verbalized understanding Goals of treatment, plan of care, development and use of communication book and progress in treatment were discussed with Wesly Madsen and his mother who verbalized understanding. She expressed agreement with discharge from treatment. Home Exercise Plan: Mother will continue to write down vocabulary words that are meaningful for Brent Levy so that they can be included in print and picture in the communication book. Cedric's mother will employ the communication book developed today with Wesly Madsen and add functional vocabulary to the [employer] and book. Cedric's mother will employ the communication book developed today with Wesly Madsen and will continue to add functional vocabulary to the communication boards and book. Objective Findings:  See above    Interventions used this date:  [x] Speech Treatment       [x] Instruction in HEP  [] Group   [] Dysphagia Treatment   [] Cognitive Skill Darren  [] Motor  [] Voice Treatment       []  AAC  Other:      Communication with other providers: none    Adverse Reactions to treatment: none     Treatment/Activity Tolerance:     [x]  Patient tolerated treatment well []  Patient limited by fatique    []  Patient limited by pain []  Patient limited by other medical complications   []  Other:     Patient Requires Follow-up:  []  Yes  [x]  No    Plan: []  Continue per plan of care []  Alter current plan (see comments)   []  Plan of care initiated []  Hold pending MD visit [x]  Discharge- goals met        Electronically signed by:   Walter Kamara MS, CCC-SLP  Speech/Language Pathologist  10/19/2020  4:56 PM

## 2020-10-19 NOTE — PROGRESS NOTES
Speech Language Pathology      [facility] Dianna Ribera 1460      [place] [facility]     [address]       [facility], [address], [address]       Maria G Marroquin [address]     [phone] [phone] [phone]  [][facility] Reyes Ribera 1460      [x][facility]          Outpatient Speech Dept. [facility]      Outpatient Adult [address] [facility] [address] [facility],Third Floor                                        Laurie Saleem, [address]     [phone] [phone] [phone]    Speech Therapy  [] Progress                                                [x] Discharge Note    Physician: Ashly Bartholomew PA-C      From: Dhruv Damon 87, CCC-SLP   Patient: Angella Shipman       : 1965  Diagnosis/  ICD10: Poor short term memory R41.3    Date: 10/19/2020  Treatment Diagnosis/ ICD 10:cognitive communication deficit R41.841    Plan of Care/Treatment to date:  [x] Speech-Language Evaluation/Treatment       Significant Findings At Last Visit/Comments:    · Milton's mother reported, \"You've given us some good ideas and they are working\"    Progress toward goals:  Goal 1: Will employ a communication board system to establish topic when Reji Camacho is unable to retrieval the message he wishes to communicate. Milton's mother reported that she has been employing the communication book with him to facilitate communication when communication breaks down. She indicated that Lulu Rodriguez has opened the communication book a few times to establish his topic when he was having difficulty communicating. She reported, \"he's carrying on a conversation better\" and \" He's thinking of the words he wants to say better\".   GOAL MET    Goal 2: His mother will demonstrate use of the Spaced Retrieval Strategy to facilitate short term

## 2021-04-30 ENCOUNTER — HOSPITAL ENCOUNTER (EMERGENCY)
Age: 56
Discharge: HOME | End: 2021-04-30
Attending: EMERGENCY MEDICINE
Payer: MEDICARE

## 2021-04-30 ENCOUNTER — APPOINTMENT (OUTPATIENT)
Dept: GENERAL RADIOLOGY | Age: 56
End: 2021-04-30
Payer: MEDICARE

## 2021-04-30 VITALS
HEART RATE: 85 BPM | RESPIRATION RATE: 16 BRPM | WEIGHT: 181 LBS | BODY MASS INDEX: 28.41 KG/M2 | DIASTOLIC BLOOD PRESSURE: 76 MMHG | HEIGHT: 67 IN | SYSTOLIC BLOOD PRESSURE: 147 MMHG | TEMPERATURE: 97.6 F | OXYGEN SATURATION: 97 %

## 2021-04-30 DIAGNOSIS — S01.81XA CHIN LACERATION, INITIAL ENCOUNTER: ICD-10-CM

## 2021-04-30 DIAGNOSIS — S60.222A CONTUSION OF LEFT HAND, INITIAL ENCOUNTER: ICD-10-CM

## 2021-04-30 DIAGNOSIS — S09.90XA CLOSED HEAD INJURY, INITIAL ENCOUNTER: ICD-10-CM
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
## 2021-04-30 DIAGNOSIS — S00.83XA FACIAL CONTUSION, INITIAL ENCOUNTER: ICD-10-CM

## 2021-04-30 PROCEDURE — 12011 RPR F/E/E/N/L/M 2.5 CM/<: CPT

## 2021-04-30 PROCEDURE — 73130 X-RAY EXAM OF HAND: CPT

## 2021-04-30 PROCEDURE — 2500000003 HC RX 250 WO HCPCS: Performed by: EMERGENCY MEDICINE

## 2021-04-30 PROCEDURE — 99282 EMERGENCY DEPT VISIT SF MDM: CPT

## 2021-04-30 RX ORDER — LIDOCAINE HYDROCHLORIDE AND EPINEPHRINE 10; 10 MG/ML; UG/ML
10 INJECTION, SOLUTION INFILTRATION; PERINEURAL ONCE
Status: COMPLETED | OUTPATIENT
Start: 2021-04-30 | End: 2021-04-30

## 2021-04-30 RX ORDER — SODIUM BICARBONATE 42 MG/ML
2.5 INJECTION, SOLUTION INTRAVENOUS ONCE
Status: COMPLETED | OUTPATIENT
Start: 2021-04-30 | End: 2021-04-30

## 2021-04-30 RX ADMIN — LIDOCAINE HYDROCHLORIDE,EPINEPHRINE BITARTRATE 10 ML: 10; .01 INJECTION, SOLUTION INFILTRATION; PERINEURAL at 16:05

## 2021-04-30 RX ADMIN — SODIUM BICARBONATE 2.5 MEQ: 42 INJECTION, SOLUTION INTRAVENOUS at 16:05

## 2021-04-30 ASSESSMENT — PAIN SCALES - GENERAL
PAINLEVEL_OUTOF10: 4
PAINLEVEL_OUTOF10: 4

## 2021-04-30 ASSESSMENT — PAIN DESCRIPTION - LOCATION: LOCATION: HAND

## 2021-04-30 ASSESSMENT — PAIN DESCRIPTION - ORIENTATION: ORIENTATION: LEFT

## 2021-04-30 NOTE — ED NOTES
Discharge instructions given to pt and male family member. Instructed to follow up with his family dr and to return to ER if any problems or concerns. Pt and male family member verbalize understanding.  Pt discharged ambulatory with male family member     Maggie Rice RN  04/30/21 [id number]
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
## 2021-10-05 RX ORDER — SERTRALINE HYDROCHLORIDE 100 MG/1
100 TABLET, FILM COATED ORAL NIGHTLY
COMMUNITY
End: 2022-01-12

## 2021-10-05 RX ORDER — LACOSAMIDE 200 MG/1
TABLET ORAL
COMMUNITY
End: 2021-10-06 | Stop reason: SDUPTHER

## 2021-10-05 RX ORDER — KETOCONAZOLE 20 MG/ML
SHAMPOO TOPICAL
COMMUNITY
End: 2022-08-03

## 2021-10-05 RX ORDER — PRAVASTATIN SODIUM 20 MG
TABLET ORAL
COMMUNITY
Start: 2020-08-10 | End: 2022-01-12

## 2021-10-06 ENCOUNTER — OFFICE VISIT (OUTPATIENT)
Dept: NEUROLOGY | Age: 56
End: 2021-10-06
Payer: MEDICARE

## 2021-10-06 VITALS
WEIGHT: 185 LBS | DIASTOLIC BLOOD PRESSURE: 82 MMHG | HEIGHT: 66 IN | HEART RATE: 85 BPM | OXYGEN SATURATION: 97 % | BODY MASS INDEX: 29.73 KG/M2 | SYSTOLIC BLOOD PRESSURE: 122 MMHG

## 2021-10-06 DIAGNOSIS — G93.1 HYPOXIC BRAIN INJURY (HCC): ICD-10-CM

## 2021-10-06 DIAGNOSIS — R56.9 SEIZURE (HCC): Primary | ICD-10-CM

## 2021-10-06 PROBLEM — E03.8 SUBCLINICAL HYPOTHYROIDISM: Status: ACTIVE | Noted: 2019-04-01

## 2021-10-06 PROBLEM — J44.9 COPD (CHRONIC OBSTRUCTIVE PULMONARY DISEASE) (HCC): Status: ACTIVE | Noted: 2019-04-01

## 2021-10-06 PROBLEM — K44.9 HIATAL HERNIA: Status: ACTIVE | Noted: 2019-08-05

## 2021-10-06 PROBLEM — G81.91 RIGHT HEMIPARESIS (HCC): Status: ACTIVE | Noted: 2018-04-30

## 2021-10-06 PROBLEM — K22.2 ESOPHAGEAL STENOSIS: Status: ACTIVE | Noted: 2019-08-05

## 2021-10-06 PROCEDURE — 99214 OFFICE O/P EST MOD 30 MIN: CPT | Performed by: NURSE PRACTITIONER

## 2021-10-06 RX ORDER — LACOSAMIDE 200 MG/1
200 TABLET ORAL 2 TIMES DAILY
Qty: 60 TABLET | Refills: 3 | Status: SHIPPED | OUTPATIENT
Start: 2021-10-06 | End: 2022-01-12 | Stop reason: SDUPTHER

## 2021-10-06 NOTE — PROGRESS NOTES
10/6/21    Saskial Corn  1965    Chief Complaint   Patient presents with    Follow-up     Seizures. Happening in the daytime now and they didnt used to be. History of Present Illness  Jennifer Gonzalez is a 61 y.o. male presenting today for follow-up for seizure disorder. He remains on Vimpat 150 mg in a.m. and 200 mg at bedtime, his morning dose was lowered as his lacosamide level was mildly elevated on last visit. Valproic acid level was normal, as well as BMP. Mother reported that Jennifer Gonzalez had not had any seizure activity prior to decreasing his morning dose, this was verified prior to lowering dose. Mother tells me today that Jennifer Gonzalez has since had 2 seizures during the day as well as a few seizures at night since lowering the dose. The 2 seizures during the day are very unusual for Jennifer Gonzalez as he usually will only have seizure activity at nighttime 1-2 times a month. Mom does not actually witness the nighttime seizures, she notices that he will wet the bed and his bed sheets are a mess and the belongings on his headboard behind his bed are disheveled. She denies ever seeing any blood in the corner of his mouth in the morning. The 2 seizures that he had during the day both were unwitnessed. Mom and dad found him in the yard laying, he was responsive, he did not lose bowel or bladder function, he did not bite his tongue but he had the \"glazed\" appearance that he has after a seizure. Mom reports that Jennifer Gonzalez sleeps well, is eating well, has not missed any doses of medication. He has been doing well otherwise and since episodes. Current Outpatient Medications   Medication Sig Dispense Refill    pravastatin (PRAVACHOL) 20 MG tablet pravastatin 20 mg tablet      ketoconazole (NIZORAL) 2 % shampoo ketoconazole 2 % shampoo      lacosamide (VIMPAT) 200 MG tablet Vimpat 200 mg tablet   Take 1 tablet twice a day by oral route.       sertraline (ZOLOFT) 100 MG tablet Take 100 mg by mouth nightly      donepezil (ARICEPT) 5 MG tablet Take 5 mg by mouth nightly      FLUoxetine (PROZAC) 20 MG capsule Take 20 mg by mouth daily      Omega-3 Fatty Acids (FISH OIL) 1200 MG CAPS Take 1 capsule by mouth daily      Garlic 2249 MG CAPS Take 1 tablet by mouth daily      divalproex (DEPAKOTE ER) 250 MG extended release tablet Take 3 tablets by mouth 2 times daily (Patient taking differently: Take 500 mg by mouth 2 times daily ) 180 tablet 0    Multiple Vitamins-Minerals (MULTIVITAMIN MEN 50+) TABS Take 1 tablet by mouth daily      vitamin B-12 (CYANOCOBALAMIN) 500 MCG tablet Take 500 mcg by mouth daily      b complex vitamins capsule Take 1 capsule by mouth daily       No current facility-administered medications for this visit. Physical Exam:  Also present during visit: other family mother.     Constitutional   Weight: well nourished  Heart/Vascular   Rate and Rhythm: RRR   Murmurs: none   Arterial Pulses:  no carotid bruits  Neck   Appearance/Palpation/Auscultation: supple  Mental Status   Orientation: oriented to person   Mood/Affect: appropriate mood and appropriate affect   Memory/Other: Memory impairment, history of hypoxic brain injury  Language   Language: dysarthria mild, (normal) language, no dysarthria, (normal) articulation and no dysphasia/aphasia  Cranial Nerves   CN II Right: visual fields appear intact, funduscopic exam grossly normal and optic nerve normal appearance   CN II Left: visual fields appear intact, funduscopic exam grossly normal and optic nerve normal appearance   CN III, IV, VI: EOM no nystagmus, normal pursuit and extraocular muscle strength normal   CN III: pupil normal size, pupil reactive to light and dark, pupil accomodates and no ptosis   CN IV: normal   CN VI: normal   CN V Right: normal sensation and muscles of mastication intact   CN V Left: normal sensation and muscles of mastication intact   CN VII Right: normal facial expression   CN VII Left: normal facial expression   CN VIII Right: normal hearing to finger rub and hearing in tact to normal conversation   CN VIII Left: normal hearing to finger rub and hearing in tact to normal conversation   CN XI Right:    CN XI Left:    CN XII: no tremors of the tongue, no fasciculation of the tongue, tongue protrudes midline, normal power to left and normal power to right  Gait and Stance   Gait/Posture: station normal, ambulates independently, gait normal, tiptoe normal, tandem gait normal, heel walk normal and Romberg's test normal  Motor/Coordination Exam   General: tremulous right upper extremity essential tremor, left upper extremity resting tremor   Right Upper Extremity: normal motor strength, normal bulk and normal tone   Left Upper Extremity: normal motor strength, normal bulk and normal tone   Right Lower Extremity: normal motor strength, normal bulk and normal tone   Left Lower Extremity: normal motor strength, normal bulk and normal tone   Coordination: no drift, normal finger-to-nose, normal heel-to-shin and rapid alternating movements normal  Reflexes   Reflexes Right: DTRS are normal throughout   Reflexes Left: DTRS are normal throughout   Plantar Reflex Right: response downgoing   Plantar Reflex Left: response downgoing   Hoffmans Reflex Right: absent   Hoffmans Reflex Left: absent           /82 (Site: Left Upper Arm, Position: Sitting, Cuff Size: Medium Adult)   Pulse 85   Ht 5' 6\" (1.676 m)   Wt 185 lb (83.9 kg)   SpO2 97%   BMI 29.86 kg/m²          Diagnosis Orders   1. Seizure ([address]  lacosamide (VIMPAT) 200 MG tablet   2. Hypoxic brain injury ([address]       Assessment and Plan-We will go back up to 200 mg twice daily on his Vimpat, he obviously cannot tolerate lower dose of this medication. We will follow his lacosamide levels to ensure his levels do not get too elevated. Mom will watch for toxicity symptoms such as confusion, sleepiness, uncoordination, visual disturbance.   If mom notices any changes in Cedric's normal behavior she will notify me, she will also notify me in the event of another breakthrough seizure. I will plan on getting a lacosamide level in 3 months. He will remain on Depakote 500 mg twice daily. Medications prescribed for the patient were discussed in detail. This included a discussion of the potential risks versus potential benefits of the medications. The patient was given time to ask questions and these were answered to the best of my ability. The patient appeared to understand the information provided. Seizure precautions discussed including no driving, tub baths, climbing ladders or operating dangerous equipment until event/seizure free for 3 months. Patient will notify in the event of any breakthrough seizure or seizure-like activity including staring spells, automatisms such as lip smacking, eye blinking, recurring episodes of déjà vu, awakening having bitten tongue during sleep. Return in about 3 months (around 1/6/2022).     ELSY Betancourt NP

## 2021-11-01 ENCOUNTER — TELEPHONE (OUTPATIENT)
Dept: NEUROLOGY | Age: 56
End: 2021-11-01

## 2021-11-24 ENCOUNTER — TELEPHONE (OUTPATIENT)
Dept: NEUROLOGY | Age: 56
End: 2021-11-24

## 2022-01-12 ENCOUNTER — OFFICE VISIT (OUTPATIENT)
Dept: NEUROLOGY | Age: 57
End: 2022-01-12
Payer: MEDICARE

## 2022-01-12 VITALS
OXYGEN SATURATION: 93 % | HEIGHT: 66 IN | BODY MASS INDEX: 28.61 KG/M2 | WEIGHT: 178 LBS | DIASTOLIC BLOOD PRESSURE: 76 MMHG | HEART RATE: 67 BPM | SYSTOLIC BLOOD PRESSURE: 118 MMHG

## 2022-01-12 DIAGNOSIS — G40.409 GRAND MAL SEIZURE DISORDER (HCC): ICD-10-CM

## 2022-01-12 DIAGNOSIS — G93.1 HYPOXIC BRAIN INJURY (HCC): ICD-10-CM

## 2022-01-12 DIAGNOSIS — R56.9 SEIZURE (HCC): Primary | ICD-10-CM

## 2022-01-12 DIAGNOSIS — R41.3 POOR SHORT-TERM MEMORY: ICD-10-CM

## 2022-01-12 PROCEDURE — 3017F COLORECTAL CA SCREEN DOC REV: CPT | Performed by: NURSE PRACTITIONER

## 2022-01-12 PROCEDURE — G8484 FLU IMMUNIZE NO ADMIN: HCPCS | Performed by: NURSE PRACTITIONER

## 2022-01-12 PROCEDURE — 99214 OFFICE O/P EST MOD 30 MIN: CPT | Performed by: NURSE PRACTITIONER

## 2022-01-12 PROCEDURE — G8419 CALC BMI OUT NRM PARAM NOF/U: HCPCS | Performed by: NURSE PRACTITIONER

## 2022-01-12 PROCEDURE — 1036F TOBACCO NON-USER: CPT | Performed by: NURSE PRACTITIONER

## 2022-01-12 PROCEDURE — G8427 DOCREV CUR MEDS BY ELIG CLIN: HCPCS | Performed by: NURSE PRACTITIONER

## 2022-01-12 RX ORDER — DIVALPROEX SODIUM 500 MG/1
500 TABLET, EXTENDED RELEASE ORAL 2 TIMES DAILY
COMMUNITY
End: 2022-04-13 | Stop reason: SDUPTHER

## 2022-01-12 RX ORDER — PRAVASTATIN SODIUM 40 MG
40 TABLET ORAL DAILY
COMMUNITY
Start: 2021-10-25

## 2022-01-12 RX ORDER — LACOSAMIDE 200 MG/1
200 TABLET ORAL 2 TIMES DAILY
Qty: 180 TABLET | Refills: 0 | Status: SHIPPED | OUTPATIENT
Start: 2022-01-12 | End: 2022-05-12

## 2022-01-12 RX ORDER — LACOSAMIDE 200 MG/1
200 TABLET ORAL 2 TIMES DAILY
COMMUNITY

## 2022-01-12 NOTE — PROGRESS NOTES
1/12/22    G[place] Jean[place]Baptiste  1965    Chief Complaint   Patient presents with    Seizures     pt states no seizure activity since November, no concerns at this time       History of Present Illness  Breanna Sorensen is a 61 y.o. male presenting today for follow-up of: Seizure disorder. He remains on Vimpat 200 mg in a.m. and 200 mg at bedtime. This dose was increased on last visit due to breakthrough seizures. He remains on Depakote 500 mg twice daily as well. Mom was supposed to have lacosamide level drawn prior to our visit today however I do not see that this was done. Prescription was sent to their home early October. Mom did not receive script. He has not had any seizures since October. Mom tells me that she started giving him his seizure medication and holding all other medications a little later and she wonders if this is possibly what is helped with breakthrough seizures. Current Outpatient Medications   Medication Sig Dispense Refill    divalproex (DEPAKOTE ER) 500 MG extended release tablet 500 mg 2 times daily       lacosamide (VIMPAT) 200 MG tablet 200 mg 2 times daily.  pravastatin (PRAVACHOL) 40 MG tablet 40 mg daily       FLUoxetine (PROZAC) 20 MG capsule Take 20 mg by mouth daily      Omega-3 Fatty Acids (FISH OIL) 1200 MG CAPS Take 1 capsule by mouth daily      Garlic 9189 MG CAPS Take 1 tablet by mouth daily      Multiple Vitamins-Minerals (MULTIVITAMIN MEN 50+) TABS Take 1 tablet by mouth daily      vitamin B-12 (CYANOCOBALAMIN) 500 MCG tablet Take 500 mcg by mouth daily      b complex vitamins capsule Take 1 capsule by mouth daily       lacosamide (VIMPAT) 200 MG tablet Take 1 tablet by mouth 2 times daily for 30 days.  60 tablet 3    ketoconazole (NIZORAL) 2 % shampoo ketoconazole 2 % shampoo (Patient not taking: Reported on 1/12/2022)      pravastatin (PRAVACHOL) 20 MG tablet pravastatin 20 mg tablet (Patient not taking: Reported on 1/12/2022)      sertraline (ZOLOFT) 100 MG tablet Take 100 mg by mouth nightly (Patient not taking: Reported on 1/12/2022)      donepezil (ARICEPT) 5 MG tablet Take 5 mg by mouth nightly (Patient not taking: Reported on 1/12/2022)      divalproex (DEPAKOTE ER) 250 MG extended release tablet Take 3 tablets by mouth 2 times daily (Patient not taking: Reported on 1/12/2022) 180 tablet 0     No current facility-administered medications for this visit. Physical Exam:  Also present during visit: other family and mom. Mental Status   Orientation: oriented to person, disoriented to place, disoriented to problem and disoriented to time    Mood/affectappropriate mood and appropriate affect   Memory/Other: recent memory impaired, remote memory impaired, reduced fund of knowledge, attention span reduced and concentration reduced  Language  Language: dysarthria moderate  Cranial Nerves   Eyes: pupils normal size and reactive to light and visual fields appear full   CN III, IV, VI : extraocular muscle strength normal, normal pursuit, no nystagmus and no ptosis   Facial Motor: normal facial motor   CN XII: tongue protrudes midline  Motor/Coordination Exam   Power: motor strength appears intact throughout, no arm drift and normal tone   Coordination: forearm rotation intact, dysmetria EU right and dysdiadochokinesia right  Sensory Exam No Bradykinesia, No Dyskindesia, No myoclonus, Normal strength, Normal Tone Normal bulk and Normal tone tremulous right upper extremity essential tremor, left upper extremity resting tremor     Gait and Stance   Gait/Posture: station normal, casual gait normal, ambulates independently , tiptoe normal and abnormal Romberg's test mild        /76 (Site: Left Upper Arm, Position: Sitting, Cuff Size: Large Adult)   Pulse 67   Ht 5' 6\" (1.676 m)   Wt 178 lb (80.7 kg)   SpO2 93%   BMI 28.73 kg/m²     Assessment and Plan     Diagnosis Orders   1. Seizure ([address]  LACOSAMIDE LEVEL   2. Grand mal seizure disorder ([address]     3. Hypoxic brain injury ([facility]     4. Poor short-term memory     Oliverio Ying is doing well, he has not had no other breakthrough seizures since mom first reported in October. We will not make any changes to current medication regimen at this time. I will provide mom with a prescription for lacosamide level however she does not need to have this drawn just yet. If Oliverio Ying starts having breakthrough seizures again she will then have a level drawn otherwise she will await his next lab draw orders from other providers and have lacosamide level drawn at that time to reduce the number of times Oliverio Ying will have a needlestick. Mom would like to discuss possible sleep study on our next follow-up visit in 6 months. Mom will let me know if Oliverio Ying does have another breakthrough seizure. Seizure precautions discussed including no tub baths, climbing ladders or operating dangerous equipment until event/seizure free for 3 months in the event of a breakthrough seizure. Patient will notify in the event of any breakthrough seizure or seizure-like activity including staring spells, automatisms such as lip smacking, eye blinking, recurring episodes of déjà vu, awakening having bitten tongue during sleep. Medications prescribed for the patient were discussed in detail. This included a discussion of the potential risks versus potential benefits of the medications. The patient was given time to ask questions and these were answered to the best of my ability. The patient appeared to understand the information provided. Return in about 6 months (around 7/12/2022).     ELSY Nunez NP

## 2022-01-27 ENCOUNTER — TELEPHONE (OUTPATIENT)
Dept: NEUROLOGY | Age: 57
End: 2022-01-27

## 2022-01-27 NOTE — TELEPHONE ENCOUNTER
Ladene Cheadle called to let us know Erendira Singh had another seizure Sunday morning around 6 am. It did not last very long and no illnesses. She states she was told to call in and report seizures so that is what she was doing.

## 2022-04-13 NOTE — TELEPHONE ENCOUNTER
Patient requests refill. Patient is out and Anamika Spencer is on vacation, please sign. I did confirm dosage over phone with patient.

## 2022-04-14 RX ORDER — DIVALPROEX SODIUM 500 MG/1
500 TABLET, EXTENDED RELEASE ORAL 2 TIMES DAILY
Qty: 60 TABLET | Refills: 2 | Status: SHIPPED | OUTPATIENT
Start: 2022-04-14 | End: 2022-07-14 | Stop reason: SDUPTHER

## 2022-05-09 DIAGNOSIS — R56.9 SEIZURE (HCC): ICD-10-CM

## 2022-05-10 NOTE — TELEPHONE ENCOUNTER
Requested Prescriptions     Pending Prescriptions Disp Refills    lacosamide (VIMPAT) 200 MG tablet [Pharmacy Med Name: Lacosamide 200 MG Oral Tablet] 180 tablet 0     Sig: Take 1 tablet by mouth twice daily

## 2022-05-12 RX ORDER — LACOSAMIDE 200 MG/1
200 TABLET ORAL 2 TIMES DAILY
Qty: 60 TABLET | Refills: 2 | Status: SHIPPED | OUTPATIENT
Start: 2022-05-12 | End: 2022-08-08

## 2022-05-12 NOTE — TELEPHONE ENCOUNTER
Nohemi'd call from pt wife requesting Vimpat refill sent to [address] [place] [address].

## 2022-07-08 DIAGNOSIS — R56.9 SEIZURE (HCC): ICD-10-CM

## 2022-07-08 DIAGNOSIS — G40.409 GRAND MAL SEIZURE DISORDER (HCC): Primary | ICD-10-CM

## 2022-07-14 RX ORDER — DIVALPROEX SODIUM 500 MG/1
500 TABLET, EXTENDED RELEASE ORAL 2 TIMES DAILY
Qty: 60 TABLET | Refills: 2 | Status: SHIPPED | OUTPATIENT
Start: 2022-07-14 | End: 2022-10-11

## 2022-08-03 ENCOUNTER — OFFICE VISIT (OUTPATIENT)
Dept: NEUROLOGY | Age: 57
End: 2022-08-03
Payer: MEDICARE

## 2022-08-03 VITALS
WEIGHT: 178 LBS | SYSTOLIC BLOOD PRESSURE: 116 MMHG | OXYGEN SATURATION: 93 % | HEIGHT: 66 IN | DIASTOLIC BLOOD PRESSURE: 68 MMHG | BODY MASS INDEX: 28.61 KG/M2 | HEART RATE: 76 BPM

## 2022-08-03 DIAGNOSIS — R56.9 SEIZURE (HCC): Primary | ICD-10-CM

## 2022-08-03 DIAGNOSIS — G93.1 HYPOXIC BRAIN INJURY (HCC): ICD-10-CM

## 2022-08-03 PROCEDURE — 99214 OFFICE O/P EST MOD 30 MIN: CPT | Performed by: NURSE PRACTITIONER

## 2022-08-03 PROCEDURE — G8427 DOCREV CUR MEDS BY ELIG CLIN: HCPCS | Performed by: NURSE PRACTITIONER

## 2022-08-03 PROCEDURE — 3017F COLORECTAL CA SCREEN DOC REV: CPT | Performed by: NURSE PRACTITIONER

## 2022-08-03 PROCEDURE — 1036F TOBACCO NON-USER: CPT | Performed by: NURSE PRACTITIONER

## 2022-08-03 PROCEDURE — G8419 CALC BMI OUT NRM PARAM NOF/U: HCPCS | Performed by: NURSE PRACTITIONER

## 2022-08-03 NOTE — PROGRESS NOTES
8/3/22    Erikaashleigh Lo  1965    Chief Complaint   Patient presents with    Follow-up     Pt has been stable. Mother thinks he's had 2 since last visit but none in the last couple months. Medication Refill     Seizure meds       History of Present Illness  Antonia Hummel is a 61 y.o. male presenting today for follow-up of:      He has had two breakthrough seizures since last visit. This was in January and March. He did lose bladder function and had postictal .       Current Outpatient Medications   Medication Sig Dispense Refill    divalproex (DEPAKOTE ER) 500 MG extended release tablet Take 1 tablet by mouth 2 times daily 60 tablet 2    lacosamide (VIMPAT) 200 MG tablet 200 mg 2 times daily. pravastatin (PRAVACHOL) 40 MG tablet 40 mg daily       FLUoxetine (PROZAC) 20 MG capsule Take 20 mg by mouth daily      Garlic 0669 MG CAPS Take 1 tablet by mouth daily      Multiple Vitamins-Minerals (MULTIVITAMIN MEN 50+) TABS Take 1 tablet by mouth daily      vitamin B-12 (CYANOCOBALAMIN) 500 MCG tablet Take 500 mcg by mouth daily      b complex vitamins capsule Take 1 capsule by mouth daily       lacosamide (VIMPAT) 200 MG tablet Take 1 tablet by mouth 2 times daily for 30 days. 60 tablet 2    ketoconazole (NIZORAL) 2 % shampoo ketoconazole 2 % shampoo (Patient not taking: No sig reported)       No current facility-administered medications for this visit.        Physical Exam:      Mental Status              Orientation: oriented to person, disoriented to place, disoriented to problem and disoriented to time                   Mood/affectappropriate mood and appropriate affect              Memory/Other: recent memory impaired, remote memory impaired, reduced fund of knowledge, attention span reduced and concentration reduced  Language  Language: dysarthria moderate  Cranial Nerves              Eyes: pupils normal size and reactive to light and visual fields appear full              CN III, IV, VI : extraocular muscle strength normal, right eye delayed with tracking, no nystagmus and no ptosis              Facial Motor: normal facial motor              CN XII: tongue protrudes midline  Motor/Coordination Exam              Power: motor strength appears intact throughout, no arm drift and normal tone              Coordination: forearm rotation intact, dysmetria EU right and dysdiadochokinesia right  Sensory Exam No Bradykinesia, No Dyskindesia, No myoclonus, Normal strength, Normal Tone Normal bulk and Normal tone tremulous right upper extremity essential tremor with mild resting tremor, very mild left upper extremity resting tremor                Gait and Stance              Gait/Posture: station normal, casual gait normal, ambulates independently , tiptoe normal and abnormal Romberg's test mild      /68 (Site: Left Upper Arm, Position: Sitting, Cuff Size: Medium Adult)   Pulse 76   Ht 5' 6\" (1.676 m)   Wt 178 lb (80.7 kg)   SpO2 93%   BMI 28.73 kg/m²     Assessment and Plan     Diagnosis Orders   1. Seizure ([address]  Valproic Acid Level, Total and Free    LACOSAMIDE LEVEL      2. Hypoxic brain injury ([address]            Return in about 6 months (around 2/3/2023).     ELSY Alvarenga - NP

## 2022-08-08 DIAGNOSIS — R56.9 SEIZURE (HCC): ICD-10-CM

## 2022-08-08 RX ORDER — LACOSAMIDE 200 MG/1
TABLET ORAL
Qty: 60 TABLET | Refills: 3 | Status: SHIPPED | OUTPATIENT
Start: 2022-08-08 | End: 2022-09-07

## 2022-08-08 NOTE — TELEPHONE ENCOUNTER
Automatic refill from pharmacy.      Requested Prescriptions     Pending Prescriptions Disp Refills    lacosamide (VIMPAT) 200 MG tablet [Pharmacy Med Name: Lacosamide 200 MG Oral Tablet] 60 tablet 0     Sig: Take 1 tablet by mouth twice daily

## 2022-08-10 ENCOUNTER — TELEPHONE (OUTPATIENT)
Dept: NEUROLOGY | Age: 57
End: 2022-08-10

## 2022-10-06 ENCOUNTER — TELEPHONE (OUTPATIENT)
Dept: NEUROLOGY | Age: 57
End: 2022-10-06

## 2022-10-06 NOTE — TELEPHONE ENCOUNTER
Received call from pt mother stating pt had two seizures on Wednesday am, one at 2 am and another at 6 am. She stated pt was in bed for the first seizure but was in the bathroom and fell during the second. She stated he did have urinary incontinence with both episodes with arm flailing but denies pt biting tongue. She denies any missed doses of medication. Please advise.

## 2022-10-10 DIAGNOSIS — R56.9 SEIZURE (HCC): Primary | ICD-10-CM

## 2022-10-10 DIAGNOSIS — G40.409 GRAND MAL SEIZURE DISORDER (HCC): ICD-10-CM

## 2022-10-10 DIAGNOSIS — G93.1 HYPOXIC BRAIN INJURY (HCC): ICD-10-CM

## 2022-10-11 DIAGNOSIS — R56.9 SEIZURE (HCC): ICD-10-CM

## 2022-10-11 RX ORDER — DIVALPROEX SODIUM 500 MG/1
TABLET, EXTENDED RELEASE ORAL
Qty: 60 TABLET | Refills: 0 | Status: SHIPPED | OUTPATIENT
Start: 2022-10-11

## 2022-10-11 NOTE — TELEPHONE ENCOUNTER
Patient is not due for an appt until February 2023. Please send in refill in to hold her over until then.        Requested Prescriptions     Pending Prescriptions Disp Refills    divalproex (DEPAKOTE ER) 500 MG extended release tablet [Pharmacy Med Name: Divalproex Sodium  MG Oral Tablet Extended Release 24 Hour] 60 tablet 0     Sig: Take 1 tablet by mouth twice daily

## 2022-10-11 NOTE — TELEPHONE ENCOUNTER
Notified pt mother that Otto Osuna has sent in [facility] rx and sent referral to epilepsy specialist.

## 2022-10-31 ENCOUNTER — TELEPHONE (OUTPATIENT)
Dept: NEUROLOGY | Age: 57
End: 2022-10-31

## 2022-10-31 DIAGNOSIS — R56.9 SEIZURE (HCC): ICD-10-CM

## 2022-10-31 NOTE — TELEPHONE ENCOUNTER
Pt's mother came in and stated [facility] does not have [facility] and does not know when they will be able to get it in. I have called [facility] and they are able to get the rx as long as the pt's insurance will cover the medication. All the information has been sent to [facility] to complete an authorization.

## 2022-11-07 PROBLEM — L72.3 SEBACEOUS CYST: Status: ACTIVE | Noted: 2022-11-07

## 2022-11-11 DIAGNOSIS — R56.9 SEIZURE (HCC): ICD-10-CM

## 2022-11-11 RX ORDER — DIVALPROEX SODIUM 500 MG/1
TABLET, EXTENDED RELEASE ORAL
Qty: 60 TABLET | Refills: 3 | Status: SHIPPED | OUTPATIENT
Start: 2022-11-11

## 2022-11-11 NOTE — TELEPHONE ENCOUNTER
Patient is not due in office until February 2023, patient will need a refill to at least hold them over until then. Requested Prescriptions     Pending Prescriptions Disp Refills    divalproex (DEPAKOTE ER) 500 MG extended release tablet [Pharmacy Med Name: Divalproex Sodium  MG Oral Tablet Extended Release 24 Hour] 60 tablet 0     Sig: Take 1 tablet by mouth twice daily      Please refill with refills on it.

## 2022-12-05 ENCOUNTER — TELEPHONE (OUTPATIENT)
Dept: NEUROLOGY | Age: 57
End: 2022-12-05

## 2022-12-05 NOTE — TELEPHONE ENCOUNTER
Pt mother stopped in office stating they contacted [facility] to schedule with Dr. Tess Corrigan per referral to epilepsy specialist. She stated they scheduled pt with Dr. Nicola Jerome and told them they would no longer be able to be seen in [facility] so she is going to cancel appt. She also stated Shalonda Lewis told them they could wait to have follow up until April because that's when he sees PCP. She is requesting call back to confirm this is ok.

## 2022-12-07 ENCOUNTER — HOSPITAL ENCOUNTER (OUTPATIENT)
Dept: GENERAL RADIOLOGY | Age: 57
Discharge: HOME | End: 2022-12-07
Payer: MEDICARE

## 2022-12-07 ENCOUNTER — HOSPITAL ENCOUNTER (OUTPATIENT)
Dept: SPEECH THERAPY | Age: 57
Setting detail: THERAPIES SERIES
Discharge: HOME | End: 2022-12-07
Payer: MEDICARE

## 2022-12-07 DIAGNOSIS — R56.9 SEIZURE (HCC): ICD-10-CM

## 2022-12-07 DIAGNOSIS — R13.10 PROBLEMS WITH SWALLOWING AND MASTICATION: ICD-10-CM

## 2022-12-07 PROCEDURE — 92611 MOTION FLUOROSCOPY/SWALLOW: CPT

## 2022-12-07 PROCEDURE — 74230 X-RAY XM SWLNG FUNCJ C+: CPT
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
## 2022-12-08 RX ORDER — LACOSAMIDE 200 MG/1
TABLET ORAL
Qty: 60 TABLET | Refills: 0 | Status: SHIPPED | OUTPATIENT
Start: 2022-12-08 | End: 2023-01-07

## 2022-12-08 NOTE — TELEPHONE ENCOUNTER
Patient is due to follow up Feburary 2023 and is due for a refill, while we are getting him scheduled, please refill med to at least hold them over until they are seen.      Requested Prescriptions     Pending Prescriptions Disp Refills    lacosamide (VIMPAT) 200 MG tablet [Pharmacy Med Name: Lacosamide 200 MG Oral Tablet] 60 tablet 0     Sig: Take 1 tablet by mouth twice daily

## 2022-12-12 ENCOUNTER — HOSPITAL ENCOUNTER (OUTPATIENT)
Dept: SPEECH THERAPY | Age: 57
Setting detail: THERAPIES SERIES
Discharge: HOME | End: 2022-12-12
Payer: MEDICARE

## 2022-12-12 PROCEDURE — 92526 ORAL FUNCTION THERAPY: CPT

## 2022-12-12 NOTE — TELEPHONE ENCOUNTER
Notified pt mother that we will discuss with Cuong Broussard when she returns to the office 1/10/23. She voiced understanding.

## 2022-12-12 NOTE — TELEPHONE ENCOUNTER
LM for pt mother to call back. Will inform that Altamease Deiters is not due back in the office until January.

## 2022-12-12 NOTE — PLAN OF CARE
[x][facility] 1460      [facility] [address]       Outpatient Pediatric Dept     [address] [facility]       [address], [address], [address]       [address], [address]     [phone]  Fax [phone] [phone]    [][facility] 1460               [][facility]          Outpatient Speech Dept. [address] [address]            Outpatient Pediatric Rehab Dept     [facility]             [facility] [address], [address] [facility]       [phone] [phone] [phone]     Physician: Dr. Victor Hugo Yu   From: Miladis Sher MS, CF-SLP     Patient: Cami Thacker     : 1965  Medical Diagnosis and ICD 10: R13.12 oropharyngeal dysphagia      Date: 2022  Date of Initial Eval: 2022  Treatment Diagnosis and ICD10: R13.12 oropharyngeal dysphagia     Speech Therapy Certification/Re-Certification Form    Dear Dr. Victor Hugo Yu,  The following patient has been evaluated for speech therapy services and for therapy to continue, insurance requires physician review of the treatment plan initially and every 90 days. Please review the attached evaluation and/or summary of the patient's plan of care, and verify that you agree therapy should continue by signing the attached document and sending it back to our office.     Plan of Care/Treatment to date:  [] Speech-Language Evaluation/Treatment    [x] Dysphagia Evaluation/Treatment        [] Dysphagia Treatment via Neuromuscular Electrical Stimulation (NMES)   [] Modified Barium Swallowing Study (MBS)  [] Fiberoptic Endoscopic Evaluation of Swallow (FEES)  [] Videolaryngostroboscopy (VLS)  [] Cognitive-Linguistic Skills Development  [] Voice evaluation and Treatment      [] Evaluation, modification, and Training of Voice Prosthetic     [] Evaluation for Speech-Generating Augmentative and Alternative Communication Device   [] Therapeutic Services for the use of Speech-Generating Device. [] Other:          Dates of service in current plan: 12/12/2022-2/12/2022     Progress Related to Goals:          Frequency/Duration:  # Days per week: [x] 1 day # Weeks: [] 1 week [] 5 weeks     [] 2 days   [] 2 weeks [] 6 weeks     [] 3 days   [] 3 weeks [] 7 weeks     [] 4 days   [] 4 weeks [x] 8 weeks         [] 9 weeks [] 10 weeks         [] 11 weeks [] 12 weeks    Rehab Potential: [] Excellent [x] Good [] Fair  [] Poor     Goals:  Goal 1: Ira Restrepo will demonstrate adequate completion of targeted swallowing exercises given moderate verbal cues to increase muscle strength and improve overall swallowing function. Goal 2: Ira Restrepo will participate in ongoing bedside swallow evaluation to monitor diet tolerance and for possible diet advancement. Goal 3: Ira Restrepo will participate in further evaluation of his swallow function via modified barium swallow study as clinically indicated this POC. Goal 4: Ira Restrepo and caregivers will demonstrate understanding of education, results, and recommendations given minimal cues. Electronically signed by:  Rhett Fuchs MS, CF-SLP, 12/12/2022, 3:26 PM      If you have any questions or concerns, please don't hesitate to call.   Thank you for your referral.      Physician Signature:__________________Date:___________ Time: __________  By signing above, therapists plan is approved by physician

## 2022-12-12 NOTE — FLOWSHEET NOTE
[x][facility] [address]      [facility] [address]          Outpatient Rehab Dept     [address]       [facility] [address], [address], [address]       [address]     [phone] [phone] [phone]  [][facility] [address] [address]           Outpatient Speech Dept. [address] [facility], [address] [facility],Third [address]           [phone] [phone]    Speech and Language Pathology Daily Note      Patient Name:  Reid Torrez    :  1965  Restrictions/Precautions:    Diagnosis:    Dysphagia, unspecified [R13.10]  Treatment Diagnosis:  R13.12 oropharyngeal dysphagia  Referring Physician:   Dr. Lory Inman     Treatment Provided: dysphagia tx    Date 2022    Time in/Time out 4124-1799    Total Timed Code Min 0    Total Treatment Minutes 30    Units  G Code applied: 1 dysphagia tx    Subjective     Pt arrived with his mother and father, who observed session. Pt was pleasant and coop. Demonstrated freq wet cough with difficulty voluntarily clearing throat. Pain level: 0    Visit# / total visits:      GOALS:     Estrella Ayon will demonstrate adequate completion of targeted swallowing exercises given moderate verbal cues to increase muscle strength and improve overall swallowing function. Intro'd effortful swallow and Deja maneuver. Pt completed effortful swallow 1x given verbal cues. Pt unable to complete Deja- difficulty with coordination of tongue    Estrella Ayon will participate in ongoing bedside swallow evaluation to monitor diet tolerance and for possible diet advancement. DNT    Pt's mother reports pt is tolerating NTL, noticed slight improved in choking/coughing with liquids    Estrella Ayon will participate in further evaluation of his swallow function via modified barium swallow study as clinically indicated this POC.   BERNY Ayon and

## 2022-12-13 NOTE — FLOWSHEET NOTE
Patients Plan of Care was received and signed. Signed POC was scanned and placed in the patients chart.     Ursula Werner

## 2023-01-09 DIAGNOSIS — R56.9 SEIZURE (HCC): ICD-10-CM

## 2023-01-09 NOTE — TELEPHONE ENCOUNTER
Patient is not due back until April, please refill.       Requested Prescriptions     Pending Prescriptions Disp Refills    lacosamide (VIMPAT) 200 MG tablet [Pharmacy Med Name: Lacosamide 200 MG Oral Tablet] 60 tablet 0     Sig: Take 1 tablet by mouth twice daily

## 2023-01-10 RX ORDER — LACOSAMIDE 200 MG/1
TABLET ORAL
Qty: 60 TABLET | Refills: 3 | Status: SHIPPED | OUTPATIENT
Start: 2023-01-10 | End: 2023-02-09

## 2023-01-23 ENCOUNTER — HOSPITAL ENCOUNTER (OUTPATIENT)
Dept: SPEECH THERAPY | Age: 58
Setting detail: THERAPIES SERIES
Discharge: HOME | End: 2023-01-23
Payer: MEDICARE

## 2023-01-23 PROCEDURE — 92526 ORAL FUNCTION THERAPY: CPT

## 2023-01-23 NOTE — FLOWSHEET NOTE
[x][facility] [address] [address]      [facility] [address]          Outpatient Rehab Dept     [address]       [address] [facility], [address] [facility], [address]       [address]     [phone] [phone] [phone]  []Irwin [facility] [address]           Outpatient Speech Dept. [address] [facility], [address] [facility],Third Floor           [phone] [phone]    Speech and Language Pathology Daily Note      Patient Name:  Jessie Cisneros    :  1965  Restrictions/Precautions:    Diagnosis:    Dysphagia, unspecified [R13.10]  Treatment Diagnosis:  R13.12 oropharyngeal dysphagia  Referring Physician:   Dr. Emily Gomez     Treatment Provided: dysphagia tx    Date 2022   Time in/Time out [id number] [id number]   Total Timed Code Min 0 0   Total Treatment Minutes 30 30   Units  G Code applied: 1 dysphagia tx 1 dysphagia tx   Subjective     Pt arrived with his mother and father, who observed session. Pt was pleasant and coop. Demonstrated freq wet cough with difficulty voluntarily clearing throat. Pt arrived with his mother and father, who observed session. Pt was pleasant and coop. Pain level: 0 0   Visit# / total visits:     GOALS:     Ml Samia will demonstrate adequate completion of targeted swallowing exercises given moderate verbal cues to increase muscle strength and improve overall swallowing function. Intro'd effortful swallow and Deja maneuver. Pt completed effortful swallow 1x given verbal cues. Pt unable to complete Deja- difficulty with coordination of tongue Pt demonstrated ability to complete effortful swallow strategy 5x given models and max cues. Pt's caregivers state they are unable to get him to do recommended 10x/day SLP states that 10 can remain our goal, but minimum 5. Split 10 up if needed.     Ml Gracia will participate in ongoing bedside swallow evaluation to monitor diet tolerance and for possible diet advancement. DNT    Pt's mother reports pt is tolerating NTL, noticed slight improved in choking/coughing with liquids Cade Edgar accepted PO trials of thin and NTL via cup sips. Pt required verbal cues for rate of intake and amount of intake. No s/s of aspiration were observed with 5 trials of NTL. Immediate cough observed in 2/5 thin liquid trials. Trial chin tuck this date, pt demonstrates no overt s/s of aspiration across 3 trials with strategy use. Cade Edgar will participate in further evaluation of his swallow function via modified barium swallow study as clinically indicated this POC. DNT DNT   Cade Edgar and caregivers will demonstrate understanding of education, results, and recommendations given minimal cues. Pt's caregivers demonstrate understanding of results/recommendations and POC. Plan to assist Kevincharity Gomez in completion of exercises and cont use of thickener. Additionally plan to purchase limited flow cup to combat impulsivity with liquid intake. Pt's caregivers demonstrate understanding and agreement of strategies and recommendations. Caregivers reported significant improvement in functionality of swallow since 12/12 session. State pt is less freq coughing and use of cups to control rate of intake are beneficial.    Effective Strategies that Improve fx: N/A Verbal cues   Barriers to progress: None. None. Education completed:     Swallowing exercises intro'd (effortful and chandrika). Discussed possibility of use of limited flow cup Reviewed swallowing exercises and aspiration precautions. Home Exercise Plan:     Practice exercises daily and cont using thickener, consider limited flow cup Cont practicing daily effortful swallow exercises. Thicken liquids. Use special cup for rate of intake.       Objective Findings:      Interventions used this date:  [] Speech Treatment       [] Instruction in HEP  [] Group   [x] Dysphagia Treatment   [] Cognitive Skill Darren  [] Motor  [] Voice Treatment       []  AAC  Other:      Communication with other providers: POC sent to PCP    Adverse Reactions to treatment: none indicated. Treatment/Activity Tolerance:     [x]  Patient tolerated treatment well []  Patient limited by fatique    []  Patient limited by pain []  Patient limited by other medical complications   []  Other:     Patient Requires Follow-up:  [x]  Yes  []  No    Plan: [x]  Continue per plan of care []  Alter current plan (see comments)   []  Plan of care initiated []  Hold pending MD visit []  Discharge    Plan for Next Session:  Pt to go on hold at this time d/t family report of significant improvement.      Electronically signed by:  Lucia Murillo MS, CF-SLP, 1/23/2023

## 2023-01-23 NOTE — PLAN OF CARE
[x][facility] 1460      [facility] [address] [facility] [address]       Outpatient Pediatric Dept     [address] [facility]       [facility] [address], [address], [address]       [address]     [phone]  Fax [phone] [phone]    [][facility] 1460               [][facility]          Outpatient Speech Dept. [address] [place] [address]            Outpatient Pediatric Rehab Dept     [facility]             [address] [facility], [address] [facility],Third [facility]             [facility], [address]       [phone] [phone] [phone]     Physician: Dr. Corbin Gonzalez  From: James Valadez MS, CF-SLP     Patient: Familia Muro     : 1965  Medical Diagnosis and ICD 10: R13.12 oropharyngeal dysphagia      Date: 2023  Date of Initial Eval: 2022  Treatment Diagnosis and ICD10: R13.12 oropharyngeal dysphagia     Pieter Flores    Dear Dr. Kayla Powell,  The following patient was evaluated for dysphagia via MBSS 2022. At that time pt was recommended regular solids and nectar thick liquids. Pt was referred for outpatient dysphagia tx. Pt was seen 2x, 2022 and 2023. At initial appointment SLP recommended daily swallowing exercises, which included effortful swallow and Deja Maneuver. Pt demonstrated limited understanding of exercies and ability to coordinate structures/movements for completion. Pt was additionally recommended to utilize a special cup to limit rate and amount of oral intake of liquids. Caregivers demonstrated understanding of all recommendations this date. Follow-up session 2023, caregivers reported significant improvements, stating that the pt less frequently demonstrates signs of aspiration. Tolerating NTL and special cup for pacing/portioning.  Within session pt demonstrated tolerance of NTL without s/s of aspiration during PO trials via cup sips. SLP recommend pt continue swallowing exercises and NTL with aspiration precautions. Pt to go on hold at this time d/t improvement reported by caregivers and d/t limited ability to trial strategies/ PO intake / exercises d/t pt understanding and engagement.     Plan of Care/Treatment to date:  [] Speech-Language Evaluation/Treatment    [x] Dysphagia Evaluation/Treatment        [] Dysphagia Treatment via Neuromuscular Electrical Stimulation (NMES)   [] Modified Barium Swallowing Study (MBS)  [] Fiberoptic Endoscopic Evaluation of Swallow (FEES)  [] Videolaryngostroboscopy (VLS)  [] Cognitive-Linguistic Skills Development  [] Voice evaluation and Treatment      [] Evaluation, modification, and Training of Voice Prosthetic     [] Evaluation for Speech-Generating Augmentative and Alternative Communication Device   [] Therapeutic Services for the use of Speech-Generating Device.   [] Other:          Dates of service in current plan: 1/23/2023-4/23/2023    Goals:  Goal 1: Cedric will demonstrate adequate completion of targeted swallowing exercises given moderate verbal cues to increase muscle strength and improve overall swallowing function. Limited progress, continue  Goal 2: Cedric will participate in ongoing bedside swallow evaluation to monitor diet tolerance and for possible diet advancement. Tolerating regular solids and NTL.   Goal 3: Cedric will participate in further evaluation of his swallow function via modified barium swallow study as clinically indicated this POC. DNT  Goal 4: Cedric and caregivers will demonstrate understanding of education, results, and recommendations given minimal cues. Goal met by caregivers. Milton continues requiring max cues for understanding.       Electronically signed by:  Hanh Cortes MS, CF-SLP, 1/23/2023, 3:25 PM      If you have any questions or concerns, please don't hesitate to call.  Thank you for your referral.      Physician  Signature:__________________Date:___________ Time: __________  By signing above, therapists plan is approved by physician

## 2023-01-24 NOTE — TELEPHONE ENCOUNTER
LM for pt mother to call back. Per Judy Diaz, pt needs f/u sooner than April since he was started on new medication. Also, pt is able to do telehealth visits if he wants to follow with epileptologist at [facility]. Awaiting call back.

## 2023-01-26 ENCOUNTER — TELEPHONE (OUTPATIENT)
Dept: NEUROLOGY | Age: 58
End: 2023-01-26

## 2023-01-26 NOTE — TELEPHONE ENCOUNTER
Sw pt mother and she agreed to bring pt in sooner than April but she doesn't want to do blood work sooner than April to coordinate with labs ordered per PCP. Discussed with Rabia Ornelas and ok to f/u soon and hold off on labs until April. Notified pt mother and scheduled for ov 2/14/23.

## 2023-01-26 NOTE — TELEPHONE ENCOUNTER
Patients mother states he is doing well. She is okay with bringing him in sooner. However, she does not want to do labs multiple times, she is requesting that we do our labs when he does his 2x/year labs for his pcp. Please advise.

## 2023-02-14 ENCOUNTER — OFFICE VISIT (OUTPATIENT)
Dept: NEUROLOGY | Age: 58
End: 2023-02-14
Payer: MEDICARE

## 2023-02-14 VITALS
DIASTOLIC BLOOD PRESSURE: 70 MMHG | OXYGEN SATURATION: 68 % | HEART RATE: 92 BPM | SYSTOLIC BLOOD PRESSURE: 130 MMHG | WEIGHT: 178 LBS | HEIGHT: 66 IN | BODY MASS INDEX: 28.61 KG/M2

## 2023-02-14 DIAGNOSIS — G93.1 HYPOXIC BRAIN INJURY (HCC): Primary | ICD-10-CM

## 2023-02-14 DIAGNOSIS — G40.409 GRAND MAL SEIZURE DISORDER (HCC): ICD-10-CM

## 2023-02-14 DIAGNOSIS — R56.9 SEIZURE (HCC): ICD-10-CM

## 2023-02-14 PROCEDURE — G8427 DOCREV CUR MEDS BY ELIG CLIN: HCPCS | Performed by: NURSE PRACTITIONER

## 2023-02-14 PROCEDURE — 3017F COLORECTAL CA SCREEN DOC REV: CPT | Performed by: NURSE PRACTITIONER

## 2023-02-14 PROCEDURE — G8419 CALC BMI OUT NRM PARAM NOF/U: HCPCS | Performed by: NURSE PRACTITIONER

## 2023-02-14 PROCEDURE — 99214 OFFICE O/P EST MOD 30 MIN: CPT | Performed by: NURSE PRACTITIONER

## 2023-02-14 PROCEDURE — G8484 FLU IMMUNIZE NO ADMIN: HCPCS | Performed by: NURSE PRACTITIONER

## 2023-02-14 PROCEDURE — 1036F TOBACCO NON-USER: CPT | Performed by: NURSE PRACTITIONER

## 2023-02-14 NOTE — PROGRESS NOTES
2/14/23    Zonia Smith  1965    Chief Complaint   Patient presents with    Seizures     Pt presents for seizure f/u, pt's mother states improvement since they called about the last seizure and were put on new meds        History of Present Illness  Suellen Fuchs is a 61 y.o. male presenting today for follow-up of:  Seizure disorder. He remains on Vimpat 200 mg in a.m. and 200 mg at bedtime. This dose was increased on last visit due to breakthrough seizures. He remains on Depakote 500 mg twice daily as well. He was started on Briviact due to having breakthouth seizure. He has not had any breakthrough seizure or seizure like activity since starting Briviact. Lacosamide and valproic acid levels were checked at his last visit. Current Outpatient Medications   Medication Sig Dispense Refill    Brivaracetam (BRIVIACT) 50 MG tablet Take 1 tablet by mouth 2 times daily for 60 doses. 60 tablet 3    lacosamide (VIMPAT) 200 MG tablet Take 1 tablet by mouth twice daily 60 tablet 3    divalproex (DEPAKOTE ER) 500 MG extended release tablet Take 1 tablet by mouth twice daily 60 tablet 3    lacosamide (VIMPAT) 200 MG tablet 200 mg 2 times daily. pravastatin (PRAVACHOL) 40 MG tablet 40 mg daily       FLUoxetine (PROZAC) 20 MG capsule Take 20 mg by mouth daily      Garlic 2290 MG CAPS Take 1 tablet by mouth daily      Multiple Vitamins-Minerals (MULTIVITAMIN MEN 50+) TABS Take 1 tablet by mouth daily      vitamin B-12 (CYANOCOBALAMIN) 500 MCG tablet Take 500 mcg by mouth daily      b complex vitamins capsule Take 1 capsule by mouth daily        No current facility-administered medications for this visit.        Physical Exam:  Mental Status              Orientation: oriented to person, disoriented to place, disoriented to problem and disoriented to time                   Mood/affectappropriate mood and appropriate affect              Memory/Other: recent memory impaired, remote memory impaired, reduced fund of knowledge, attention span reduced and concentration reduced  Language  Language: dysarthria moderate  Cranial Nerves              Eyes: pupils normal size and reactive to light and visual fields appear full              CN III, IV, VI : extraocular muscle strength normal, right eye delayed with tracking, no nystagmus and no ptosis              Facial Motor: normal facial motor              CN XII: tongue protrudes midline  Motor/Coordination Exam              Power: motor strength appears intact throughout, no arm drift and normal tone              Coordination: forearm rotation intact, dysmetria EU right and dysdiadochokinesia right  Sensory Exam No Bradykinesia, No Dyskindesia, No myoclonus, Normal strength, Normal Tone Normal bulk and Normal tone tremulous right upper extremity essential tremor with mild resting tremor, very mild left upper extremity resting tremor              Gait/Posture: station normal, casual gait normal, ambulates independently , tiptoe normal and abnormal Romberg's test mild       /70 (Site: Left Upper Arm, Position: Sitting, Cuff Size: Medium Adult)   Pulse 92   Ht 5' 6\" (1.676 m)   Wt 178 lb (80.7 kg)   SpO2 (!) 68%   BMI 28.73 kg/m²     Assessment and Plan     Diagnosis Orders   1. Hypoxic brain injury ([facility] [address]        2. Seizure ([facility] [address]  Brivaracetam (BRIVIACT) 50 MG tablet      3. 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 He remains on Vimpat 200 mg in a.m. and 200 mg at bedtime, Depakote 500 mg twice daily and Briviact 50 mg twice daily. He denies any new seizure like activity since initiating Briviact. No changes made today.  Will check valproic acid, lacosamide level, CBC and CMP (every April per patient request)    They will notify us for any breakthrough seizure or seizure-like activity. Return in about 6 months (around 8/14/2023).     Chase Cox, ELSY - CNP

## 2023-02-14 NOTE — PATIENT INSTRUCTIONS
Please contact our office around 6/14/2023 to get your follow up appointment made. Our scheduling phone number is [phone]. Thank you!

## 2023-03-05 DIAGNOSIS — R56.9 SEIZURE (HCC): ICD-10-CM

## 2023-03-06 NOTE — TELEPHONE ENCOUNTER
Patient was last seen on 2/14, but patient will need a refill.     Requested Prescriptions     Pending Prescriptions Disp Refills    divalproex (DEPAKOTE ER) 500 MG extended release tablet [Pharmacy Med Name: Divalproex Sodium  MG Oral Tablet Extended Release 24 Hour] 60 tablet 0     Sig: Take 1 tablet by mouth twice daily

## 2023-03-07 RX ORDER — DIVALPROEX SODIUM 500 MG/1
TABLET, EXTENDED RELEASE ORAL
Qty: 60 TABLET | Refills: 0 | Status: SHIPPED | OUTPATIENT
Start: 2023-03-07

## 2023-05-05 DIAGNOSIS — R56.9 SEIZURE (HCC): ICD-10-CM

## 2023-05-08 RX ORDER — LACOSAMIDE 200 MG/1
TABLET ORAL
Qty: 60 TABLET | Refills: 0 | Status: SHIPPED | OUTPATIENT
Start: 2023-05-08 | End: 2023-06-07

## 2023-06-05 DIAGNOSIS — R56.9 SEIZURE (HCC): ICD-10-CM

## 2023-06-06 NOTE — TELEPHONE ENCOUNTER
Patient is scheduled for a follow up on 7/31, patient will need a refill.      Requested Prescriptions     Pending Prescriptions Disp Refills    lacosamide (VIMPAT) 200 MG tablet [Pharmacy Med Name: Lacosamide 200 MG Oral Tablet] 60 tablet 4     Sig: Take 1 tablet by mouth twice daily

## 2023-06-07 RX ORDER — LACOSAMIDE 200 MG/1
TABLET ORAL
Qty: 60 TABLET | Refills: 4 | Status: SHIPPED | OUTPATIENT
Start: 2023-06-07 | End: 2023-07-07

## 2023-06-26 DIAGNOSIS — R56.9 SEIZURE (HCC): ICD-10-CM

## 2023-06-26 DIAGNOSIS — G40.409 GRAND MAL SEIZURE DISORDER (HCC): ICD-10-CM

## 2023-06-28 RX ORDER — BRIVARACETAM 50 MG/1
TABLET, FILM COATED ORAL
Qty: 60 TABLET | Refills: 5 | Status: SHIPPED | OUTPATIENT
Start: 2023-06-28 | End: 2024-07-29

## 2023-07-20 ENCOUNTER — TELEPHONE (OUTPATIENT)
Dept: NEUROLOGY | Age: 58
End: 2023-07-20

## 2023-07-20 NOTE — TELEPHONE ENCOUNTER
Pt  mother LM inquiring if pt needs labs prior to ov scheduled 7/31/23. Informed her that no labs are ordered but will check with provider. Please advise.

## 2023-07-27 NOTE — TELEPHONE ENCOUNTER
Patient's family called to confirm appointment and let them know there was no need for labs prior to appointment.

## 2023-07-31 ENCOUNTER — OFFICE VISIT (OUTPATIENT)
Dept: NEUROLOGY | Age: 58
End: 2023-07-31
Payer: MEDICARE

## 2023-07-31 VITALS
WEIGHT: 178 LBS | DIASTOLIC BLOOD PRESSURE: 72 MMHG | HEART RATE: 71 BPM | HEIGHT: 66 IN | SYSTOLIC BLOOD PRESSURE: 122 MMHG | OXYGEN SATURATION: 95 % | BODY MASS INDEX: 28.61 KG/M2

## 2023-07-31 DIAGNOSIS — R41.3 POOR SHORT-TERM MEMORY: ICD-10-CM

## 2023-07-31 DIAGNOSIS — G93.1 HYPOXIC BRAIN INJURY (HCC): ICD-10-CM

## 2023-07-31 DIAGNOSIS — R56.9 SEIZURE (HCC): ICD-10-CM

## 2023-07-31 DIAGNOSIS — R56.9 SEIZURE (HCC): Primary | ICD-10-CM

## 2023-07-31 PROCEDURE — G8427 DOCREV CUR MEDS BY ELIG CLIN: HCPCS | Performed by: NURSE PRACTITIONER

## 2023-07-31 PROCEDURE — 99213 OFFICE O/P EST LOW 20 MIN: CPT | Performed by: NURSE PRACTITIONER

## 2023-07-31 PROCEDURE — 1036F TOBACCO NON-USER: CPT | Performed by: NURSE PRACTITIONER

## 2023-07-31 PROCEDURE — G8419 CALC BMI OUT NRM PARAM NOF/U: HCPCS | Performed by: NURSE PRACTITIONER

## 2023-07-31 PROCEDURE — 3017F COLORECTAL CA SCREEN DOC REV: CPT | Performed by: NURSE PRACTITIONER

## 2023-07-31 NOTE — PATIENT INSTRUCTIONS
Please contact our office around 10/15/2023 to get your follow up appointment made. Our scheduling phone number is [phone]. Thank you!

## 2023-07-31 NOTE — PROGRESS NOTES
7/31/23    [facility]  1965    Chief Complaint   Patient presents with    Seizures     Pt presents for f/u of seizures and hypoxic brain injury        History of Present Illness  Nargis Luna is a 61 y.o. male presenting today for follow-up of: Seizure disorder. He remains on Vimpat 200 mg in the morning and in the evening, Depakote 500 mg twice daily. Briviact 50 mg twice daily was added for further seizure prophylaxis. Labs were checked 4/2023 and were WNL. Today, he is accompanied by his mom. There have been no concerns for breakthrough seizure activity. There is been an improvement since starting Briviact. His mother is concerned that Nargis Luna has been sleeping more. She is also concerned about his short-term memory. Current Outpatient Medications   Medication Sig Dispense Refill    BRIVIACT 50 MG tablet TAKE ONE (1) TABLET BY MOUTH TWO TIMES DAILY 60 tablet 5    divalproex (DEPAKOTE ER) 500 MG extended release tablet Take 1 tablet by mouth twice daily 60 tablet 3    lacosamide (VIMPAT) 200 MG tablet 1 tablet 2 times daily. pravastatin (PRAVACHOL) 40 MG tablet 1 tablet daily      FLUoxetine (PROZAC) 20 MG capsule Take 1 capsule by mouth daily      Multiple Vitamins-Minerals (MULTIVITAMIN MEN 50+) TABS Take 1 tablet by mouth daily      vitamin B-12 (CYANOCOBALAMIN) 500 MCG tablet Take 1 tablet by mouth daily      b complex vitamins capsule Take 1 capsule by mouth daily      lacosamide (VIMPAT) 200 MG tablet Take 1 tablet by mouth twice daily (Patient not taking: Reported on 7/31/2023) 60 tablet 4    Garlic 5195 MG CAPS Take 1 tablet by mouth daily (Patient not taking: Reported on 7/31/2023)       No current facility-administered medications for this visit. Physical Exam:  Also present during visit:  mom .   Mental Status              Orientation: oriented to person, disoriented to place, disoriented to problem and disoriented to time                   Mood/affectappropriate mood and

## 2023-08-01 NOTE — TELEPHONE ENCOUNTER
Requested Prescriptions     Pending Prescriptions Disp Refills    divalproex (DEPAKOTE ER) 500 MG extended release tablet [Pharmacy Med Name: Divalproex Sodium  MG Oral Tablet Extended Release 24 Hour] 60 tablet 0     Sig: Take 1 tablet by mouth twice daily

## 2023-08-03 RX ORDER — DIVALPROEX SODIUM 500 MG/1
TABLET, EXTENDED RELEASE ORAL
Qty: 60 TABLET | Refills: 0 | Status: SHIPPED | OUTPATIENT
Start: 2023-08-03

## 2023-09-01 DIAGNOSIS — R56.9 SEIZURE (HCC): ICD-10-CM

## 2023-09-05 NOTE — TELEPHONE ENCOUNTER
Last ov 7/31/23, next ov due 1/2024.      Requested Prescriptions     Pending Prescriptions Disp Refills    divalproex (DEPAKOTE ER) 500 MG extended release tablet [Pharmacy Med Name: Divalproex Sodium  MG Oral Tablet Extended Release 24 Hour] 60 tablet 2     Sig: Take 1 tablet by mouth twice daily

## 2023-09-07 RX ORDER — DIVALPROEX SODIUM 500 MG/1
TABLET, EXTENDED RELEASE ORAL
Qty: 60 TABLET | Refills: 2 | Status: SHIPPED | OUTPATIENT
Start: 2023-09-07

## 2023-11-03 DIAGNOSIS — R56.9 SEIZURE (HCC): ICD-10-CM

## 2023-11-06 RX ORDER — LACOSAMIDE 200 MG/1
TABLET ORAL
Qty: 60 TABLET | Refills: 0 | Status: SHIPPED | OUTPATIENT
Start: 2023-11-06 | End: 2023-12-06

## 2023-11-27 DIAGNOSIS — R56.9 SEIZURE (HCC): ICD-10-CM

## 2023-11-27 RX ORDER — DIVALPROEX SODIUM 500 MG/1
500 TABLET, EXTENDED RELEASE ORAL 2 TIMES DAILY
Qty: 60 TABLET | Refills: 1 | Status: SHIPPED | OUTPATIENT
Start: 2023-11-27

## 2023-11-27 RX ORDER — DIVALPROEX SODIUM 500 MG/1
TABLET, EXTENDED RELEASE ORAL
Qty: 60 TABLET | Refills: 0 | OUTPATIENT
Start: 2023-11-27

## 2023-11-27 RX ORDER — LACOSAMIDE 200 MG/1
TABLET ORAL
Qty: 60 TABLET | Refills: 1 | Status: SHIPPED | OUTPATIENT
Start: 2023-11-27 | End: 2023-12-27

## 2023-11-27 NOTE — TELEPHONE ENCOUNTER
Last ov 7/31/23, next ov 1/16/24. Rx pending.      Requested Prescriptions     Pending Prescriptions Disp Refills    lacosamide (VIMPAT) 200 MG tablet [Pharmacy Med Name: Lacosamide 200 MG Oral Tablet] 60 tablet 1     Sig: Take 1 tablet by mouth twice daily    divalproex (DEPAKOTE ER) 500 MG extended release tablet 60 tablet 1     Sig: Take 1 tablet by mouth 2 times daily

## 2023-12-29 DIAGNOSIS — G40.409 GRAND MAL SEIZURE DISORDER (HCC): ICD-10-CM

## 2023-12-29 DIAGNOSIS — R56.9 SEIZURE (HCC): ICD-10-CM

## 2023-12-29 NOTE — TELEPHONE ENCOUNTER
Received call from pt mother stating pt needs Briviact refill. Last ov 7/31/23, next ov 1/16/24. Rx pending.      Requested Prescriptions     Pending Prescriptions Disp Refills    Brivaracetam (BRIVIACT) 50 MG tablet 60 tablet 5     Sig: TAKE ONE (1) TABLET BY MOUTH TWO TIMES DAILY

## 2024-01-16 ENCOUNTER — OFFICE VISIT (OUTPATIENT)
Dept: NEUROLOGY | Age: 59
End: 2024-01-16
Payer: COMMERCIAL

## 2024-01-16 VITALS
BODY MASS INDEX: 29.06 KG/M2 | HEART RATE: 67 BPM | OXYGEN SATURATION: 98 % | HEIGHT: 66 IN | SYSTOLIC BLOOD PRESSURE: 132 MMHG | DIASTOLIC BLOOD PRESSURE: 86 MMHG | WEIGHT: 180.8 LBS

## 2024-01-16 DIAGNOSIS — R56.9 SEIZURE (HCC): ICD-10-CM

## 2024-01-16 PROCEDURE — G8419 CALC BMI OUT NRM PARAM NOF/U: HCPCS | Performed by: NURSE PRACTITIONER

## 2024-01-16 PROCEDURE — G8484 FLU IMMUNIZE NO ADMIN: HCPCS | Performed by: NURSE PRACTITIONER

## 2024-01-16 PROCEDURE — 99213 OFFICE O/P EST LOW 20 MIN: CPT | Performed by: NURSE PRACTITIONER

## 2024-01-16 PROCEDURE — G8427 DOCREV CUR MEDS BY ELIG CLIN: HCPCS | Performed by: NURSE PRACTITIONER

## 2024-01-16 PROCEDURE — 1036F TOBACCO NON-USER: CPT | Performed by: NURSE PRACTITIONER

## 2024-01-16 PROCEDURE — 3017F COLORECTAL CA SCREEN DOC REV: CPT | Performed by: NURSE PRACTITIONER

## 2024-01-16 RX ORDER — LACOSAMIDE 200 MG/1
200 TABLET ORAL 2 TIMES DAILY
Qty: 60 TABLET | Refills: 5 | Status: SHIPPED | OUTPATIENT
Start: 2024-01-16 | End: 2024-10-16

## 2024-01-16 RX ORDER — DIVALPROEX SODIUM 500 MG/1
500 TABLET, EXTENDED RELEASE ORAL 2 TIMES DAILY
Qty: 60 TABLET | Refills: 5 | Status: SHIPPED | OUTPATIENT
Start: 2024-01-16

## 2024-01-16 NOTE — PROGRESS NOTES
1/16/24    Cedric Walls  1965    Chief Complaint   Patient presents with    Follow-up     Patient presents for follow-up for seizure and hypoxic brain injury.        History of Present Illness  Cedric is a 58 y.o. male presenting today for follow-up of: Seizure disorder. He remains on Vimpat 200 mg in the morning and in the evening, Depakote 500 mg twice daily.  Briviact 50 mg twice daily was added for further seizure prophylaxis.     Valproic acid and lacosamide level checked 4/2023 and were WNL. CMP/CBC on 10/19/23 were normal.     Milton is accompanied by his mom.  He is doing well.  No concerns for breakthrough seizure activity.    Current Outpatient Medications   Medication Sig Dispense Refill    divalproex (DEPAKOTE ER) 500 MG extended release tablet Take 1 tablet by mouth 2 times daily 60 tablet 5    lacosamide (VIMPAT) 200 MG tablet Take 1 tablet by mouth 2 times daily for 274 days. Max Daily Amount: 400 mg 60 tablet 5    Brivaracetam (BRIVIACT) 50 MG tablet TAKE ONE (1) TABLET BY MOUTH TWO TIMES DAILY 60 tablet 5    pravastatin (PRAVACHOL) 40 MG tablet 1 tablet daily      FLUoxetine (PROZAC) 20 MG capsule Take 1 capsule by mouth daily      Multiple Vitamins-Minerals (MULTIVITAMIN MEN 50+) TABS Take 1 tablet by mouth daily      vitamin B-12 (CYANOCOBALAMIN) 500 MCG tablet Take 1 tablet by mouth daily      b complex vitamins capsule Take 1 capsule by mouth daily      lacosamide (VIMPAT) 200 MG tablet 1 tablet 2 times daily. (Patient not taking: Reported on 1/16/2024)      Garlic 1000 MG CAPS Take 1 tablet by mouth daily (Patient not taking: Reported on 7/31/2023)       No current facility-administered medications for this visit.       Physical Exam:  Mental Status              Orientation: oriented to person, disoriented to place, disoriented to problem and disoriented to time                   Mood/affectappropriate mood and appropriate affect              Memory/Other: recent memory impaired, remote

## 2024-04-15 ENCOUNTER — TELEPHONE (OUTPATIENT)
Dept: NEUROLOGY | Age: 59
End: 2024-04-15

## 2024-04-16 DIAGNOSIS — R56.9 SEIZURE (HCC): Primary | ICD-10-CM

## 2024-04-17 LAB
BASOPHILS ABSOLUTE: 0.1 K/UL (ref 0–0.3)
BASOPHILS RELATIVE PERCENT: 1 % (ref 0–2)
DIFFERENTIAL COUNT: NORMAL
EOSINOPHILS ABSOLUTE: 0.1 K/UL (ref 0–0.5)
EOSINOPHILS RELATIVE PERCENT: 1.2 % (ref 0–5)
HCT VFR BLD CALC: 43.8 % (ref 37.5–51)
HEMOGLOBIN: 15.1 G/DL (ref 13–17.7)
IMMATURE GRANS (ABS): 0.1 K/UL (ref 0–0.1)
IMMATURE GRANULOCYTES %: 0.7 %
LYMPHOCYTES ABSOLUTE: 1.9 K/UL (ref 0.9–4.1)
LYMPHOCYTES RELATIVE PERCENT: 28.4 % (ref 14–51)
MCH RBC QN AUTO: 30.6 PG (ref 26–34)
MCHC RBC AUTO-ENTMCNC: 34.5 G/DL (ref 30.7–35.5)
MCV RBC AUTO: 88.8 FL (ref 80–100)
MONOCYTES ABSOLUTE: 0.7 K/UL (ref 0.2–1)
MONOCYTES RELATIVE PERCENT: 10.3 % (ref 4–12)
NEUTROPHILS ABSOLUTE: 3.9 K/UL (ref 1.8–7.5)
NEUTROPHILS RELATIVE PERCENT: 58.4 % (ref 42–80)
PDW BLD-RTO: 13.1 %
PLATELET # BLD: 294 K/UL (ref 140–400)
PMV BLD AUTO: 9.9 FL (ref 7.2–11.7)
RBC # BLD: 4.93 M/UL (ref 4.14–5.8)
RETICULOCYTE ABSOLUTE COUNT: 0 /100 WBC
WBC # BLD: 6.7 K/UL (ref 3.5–10.9)

## 2024-04-18 LAB
A/G RATIO: 1.5 RATIO (ref 0.8–2.6)
ALBUMIN: 4.3 G/DL (ref 3.5–5.2)
ALP BLD-CCNC: 76 U/L (ref 23–144)
ALT SERPL-CCNC: 28 U/L (ref 0–60)
AST SERPL-CCNC: 23 U/L (ref 0–55)
BILIRUB SERPL-MCNC: 0.3 MG/DL (ref 0–1.2)
BUN / CREAT RATIO: 17 (ref 7–25)
BUN BLDV-MCNC: 19 MG/DL (ref 3–29)
CALCIUM SERPL-MCNC: 9.4 MG/DL (ref 8.5–10.5)
CHLORIDE BLD-SCNC: 104 MEQ/L (ref 96–110)
CO2: 26 MEQ/L (ref 19–32)
CREAT SERPL-MCNC: 1.1 MG/DL (ref 0.5–1.2)
ESTIMATED GLOMERULAR FILTRATION RATE CREATININE EQUATION: 77 MLS/MIN/1.73M2
FASTING STATUS: NORMAL
GLOBULIN: 2.8 G/DL (ref 1.9–3.6)
GLUCOSE BLD-MCNC: 83 MG/DL (ref 70–99)
POTASSIUM SERPL-SCNC: 4.6 MEQ/L (ref 3.4–5.3)
SODIUM BLD-SCNC: 139 MEQ/L (ref 135–148)
TOTAL PROTEIN: 7.1 G/DL (ref 6–8.3)
VALPROIC ACID LEVEL: 65 MCG/ML (ref 50–100)

## 2024-04-24 LAB — LACOSAMIDE: 10.9 MCG/ML

## 2024-04-25 ENCOUNTER — TELEPHONE (OUTPATIENT)
Dept: NEUROLOGY | Age: 59
End: 2024-04-25

## 2024-05-01 ENCOUNTER — HOSPITAL ENCOUNTER (OUTPATIENT)
Dept: GENERAL RADIOLOGY | Age: 59
Discharge: HOME | End: 2024-05-01
Attending: FAMILY MEDICINE
Payer: COMMERCIAL

## 2024-05-01 DIAGNOSIS — T17.208S: ICD-10-CM

## 2024-05-01 DIAGNOSIS — K22.2 ESOPHAGEAL STENOSIS: ICD-10-CM

## 2024-05-01 PROCEDURE — 74220 X-RAY XM ESOPHAGUS 1CNTRST: CPT

## 2024-06-24 DIAGNOSIS — R56.9 SEIZURE (HCC): ICD-10-CM

## 2024-06-24 DIAGNOSIS — G40.409 GRAND MAL SEIZURE DISORDER (HCC): ICD-10-CM

## 2024-06-24 NOTE — TELEPHONE ENCOUNTER
Last ov 1/16/24, next ov due 10/2024. Rx pending.     Requested Prescriptions     Pending Prescriptions Disp Refills    Brivaracetam (BRIVIACT) 50 MG tablet [Pharmacy Med Name: Briviact Oral Tablet 50 MG 50 MG  Tablet] 60 tablet      Sig: TAKE ONE (1) TABLET BY MOUTH TWO TIMES DAILY

## 2024-07-02 DIAGNOSIS — R56.9 SEIZURE (HCC): ICD-10-CM

## 2024-07-02 DIAGNOSIS — G40.409 GRAND MAL SEIZURE DISORDER (HCC): ICD-10-CM

## 2024-07-03 RX ORDER — LACOSAMIDE 200 MG/1
200 TABLET ORAL 2 TIMES DAILY
Qty: 60 TABLET | Refills: 2 | OUTPATIENT
Start: 2024-07-03

## 2024-07-03 RX ORDER — LACOSAMIDE 200 MG/1
200 TABLET ORAL 2 TIMES DAILY
Qty: 60 TABLET | Refills: 5 | Status: SHIPPED | OUTPATIENT
Start: 2024-07-03 | End: 2025-04-03

## 2024-07-03 RX ORDER — DIVALPROEX SODIUM 500 MG/1
500 TABLET, EXTENDED RELEASE ORAL 2 TIMES DAILY
Qty: 60 TABLET | Refills: 5 | Status: SHIPPED | OUTPATIENT
Start: 2024-07-03

## 2024-07-31 DIAGNOSIS — R56.9 SEIZURE (HCC): ICD-10-CM

## 2024-07-31 RX ORDER — LACOSAMIDE 200 MG/1
200 TABLET ORAL 2 TIMES DAILY
Qty: 60 TABLET | Refills: 5 | Status: SHIPPED | OUTPATIENT
Start: 2024-07-31 | End: 2025-05-01

## 2024-07-31 NOTE — TELEPHONE ENCOUNTER
Requested Prescriptions     Pending Prescriptions Disp Refills    lacosamide (VIMPAT) 200 MG tablet 60 tablet 5     Sig: Take 1 tablet by mouth 2 times daily for 274 days. Max Daily Amount: 400 mg        Pt switched pharmacys and needs refills called into new pharmacy. [facility]

## 2024-08-05 DIAGNOSIS — R56.9 SEIZURE (HCC): ICD-10-CM

## 2024-08-05 RX ORDER — DIVALPROEX SODIUM 500 MG/1
500 TABLET, FILM COATED, EXTENDED RELEASE ORAL 2 TIMES DAILY
Qty: 180 TABLET | Refills: 0 | OUTPATIENT
Start: 2024-08-05

## 2024-09-25 ENCOUNTER — TELEPHONE (OUTPATIENT)
Dept: NEUROLOGY | Age: 59
End: 2024-09-25

## 2024-09-27 ENCOUNTER — TELEPHONE (OUTPATIENT)
Dept: NEUROLOGY | Age: 59
End: 2024-09-27

## 2024-09-27 LAB
A/G RATIO: 1.5 RATIO (ref 0.8–2.6)
ALBUMIN: 4.2 G/DL (ref 3.5–5.2)
ALP BLD-CCNC: 76 U/L (ref 23–144)
ALT SERPL-CCNC: 30 U/L (ref 0–60)
AST SERPL-CCNC: 19 U/L (ref 0–55)
BASOPHILS ABSOLUTE: 0.1 K/UL (ref 0–0.3)
BASOPHILS RELATIVE PERCENT: 0.9 % (ref 0–2)
BILIRUB SERPL-MCNC: 0.3 MG/DL (ref 0–1.2)
BUN / CREAT RATIO: 17 (ref 7–25)
BUN BLDV-MCNC: 19 MG/DL (ref 3–29)
CALCIUM SERPL-MCNC: 9.7 MG/DL (ref 8.5–10.5)
CHLORIDE BLD-SCNC: 103 MEQ/L (ref 96–110)
CO2: 26 MEQ/L (ref 19–32)
CREAT SERPL-MCNC: 1.1 MG/DL (ref 0.5–1.2)
DIFFERENTIAL COUNT: NORMAL
EOSINOPHILS ABSOLUTE: 0.1 K/UL (ref 0–0.5)
EOSINOPHILS RELATIVE PERCENT: 0.9 % (ref 0–5)
ESTIMATED GLOMERULAR FILTRATION RATE CREATININE EQUATION: 77 MLS/MIN/1.73M2
FASTING STATUS: NORMAL
GLOBULIN: 2.8 G/DL (ref 1.9–3.6)
GLUCOSE BLD-MCNC: 86 MG/DL (ref 70–99)
HCT VFR BLD CALC: 45.8 % (ref 37.5–51)
HEMOGLOBIN: 15.2 G/DL (ref 13–17.7)
IMMATURE GRANS (ABS): 0 K/UL (ref 0–0.1)
IMMATURE GRANULOCYTES %: 0.4 %
LYMPHOCYTES ABSOLUTE: 2.2 K/UL (ref 0.9–4.1)
LYMPHOCYTES RELATIVE PERCENT: 32.4 % (ref 14–51)
MCH RBC QN AUTO: 30.7 PG (ref 26–34)
MCHC RBC AUTO-ENTMCNC: 33.2 G/DL (ref 30.7–35.5)
MCV RBC AUTO: 92.5 FL (ref 80–100)
MONOCYTES ABSOLUTE: 0.7 K/UL (ref 0.2–1)
MONOCYTES RELATIVE PERCENT: 10.9 % (ref 4–12)
NEUTROPHILS ABSOLUTE: 3.7 K/UL (ref 1.8–7.5)
NEUTROPHILS RELATIVE PERCENT: 54.5 % (ref 42–80)
PDW BLD-RTO: 13.3 %
PLATELET # BLD: 234 K/UL (ref 140–400)
PMV BLD AUTO: 10.1 FL (ref 7.2–11.7)
POTASSIUM SERPL-SCNC: 4.4 MEQ/L (ref 3.4–5.3)
RBC # BLD: 4.95 M/UL (ref 4.14–5.8)
RETICULOCYTE ABSOLUTE COUNT: 0 /100 WBC
SODIUM BLD-SCNC: 139 MEQ/L (ref 135–148)
TOTAL PROTEIN: 7 G/DL (ref 6–8.3)
VALPROIC ACID LEVEL: 60 MCG/ML (ref 50–100)
WBC # BLD: 6.8 K/UL (ref 3.5–10.9)

## 2024-09-27 NOTE — TELEPHONE ENCOUNTER
Attempted to contact pt, could not leave vm.     Michoacano Lucas, APRN - CNP  [facility]  Please let patient's mother know : Chicas labs look great, valproic acid level is right where it needs to be!  Thank you for getting labs completed prior to the visit.

## 2024-10-02 ENCOUNTER — OFFICE VISIT (OUTPATIENT)
Dept: NEUROLOGY | Age: 59
End: 2024-10-02
Payer: COMMERCIAL

## 2024-10-02 VITALS
BODY MASS INDEX: 29.99 KG/M2 | DIASTOLIC BLOOD PRESSURE: 76 MMHG | OXYGEN SATURATION: 98 % | SYSTOLIC BLOOD PRESSURE: 132 MMHG | HEART RATE: 78 BPM | WEIGHT: 185.8 LBS

## 2024-10-02 DIAGNOSIS — G40.409 GRAND MAL SEIZURE DISORDER (HCC): ICD-10-CM

## 2024-10-02 DIAGNOSIS — R56.9 SEIZURE (HCC): ICD-10-CM

## 2024-10-02 PROCEDURE — 1036F TOBACCO NON-USER: CPT | Performed by: NURSE PRACTITIONER

## 2024-10-02 PROCEDURE — G8427 DOCREV CUR MEDS BY ELIG CLIN: HCPCS | Performed by: NURSE PRACTITIONER

## 2024-10-02 PROCEDURE — 3017F COLORECTAL CA SCREEN DOC REV: CPT | Performed by: NURSE PRACTITIONER

## 2024-10-02 PROCEDURE — G8484 FLU IMMUNIZE NO ADMIN: HCPCS | Performed by: NURSE PRACTITIONER

## 2024-10-02 PROCEDURE — G8419 CALC BMI OUT NRM PARAM NOF/U: HCPCS | Performed by: NURSE PRACTITIONER

## 2024-10-02 PROCEDURE — 99213 OFFICE O/P EST LOW 20 MIN: CPT | Performed by: NURSE PRACTITIONER

## 2024-10-02 RX ORDER — DIVALPROEX SODIUM 500 MG/1
500 TABLET, FILM COATED, EXTENDED RELEASE ORAL 2 TIMES DAILY
Qty: 60 TABLET | Refills: 5 | Status: SHIPPED | OUTPATIENT
Start: 2024-10-02

## 2024-10-02 RX ORDER — LACOSAMIDE 200 MG/1
200 TABLET ORAL 2 TIMES DAILY
Qty: 60 TABLET | Refills: 5 | Status: SHIPPED | OUTPATIENT
Start: 2024-10-02 | End: 2025-07-03

## 2024-10-02 NOTE — PROGRESS NOTES
10/2/24    Cedric Walls  1965    Chief Complaint   Patient presents with    Follow-up     Patient here for 9 month follow up on Seizure, per patient has been doing well       History of Present Illness  Cedric is a 59 y.o. male presenting today for follow-up of: Seizure disorder.  He remains on Vimpat 200 mg in the morning and in the evening, Depakote 500 mg twice daily.  Briviact 50 mg twice daily was added for further seizure prophylaxis.     Labs were checked  and were WNL 9/27/24.     Today, he is accompanied by his mom.  There have been no concerns for breakthrough seizure activity.  There is been an improvement since starting Briviact.  His mother is concerned that Cedric has been sleeping more.  She is also concerned about his short-term memory, he does not like to play cards as much as he used to.  His behavior has not changed.    Current Outpatient Medications   Medication Sig Dispense Refill    Brivaracetam (BRIVIACT) 50 MG tablet TAKE ONE (1) TABLET BY MOUTH TWO TIMES DAILY 60 tablet 5    divalproex (DEPAKOTE ER) 500 MG extended release tablet Take 1 tablet by mouth 2 times daily 60 tablet 5    lacosamide (VIMPAT) 200 MG tablet Take 1 tablet by mouth 2 times daily for 274 days. Max Daily Amount: 400 mg 60 tablet 5    pravastatin (PRAVACHOL) 40 MG tablet 1 tablet daily      FLUoxetine (PROZAC) 20 MG capsule Take 1 capsule by mouth daily      Multiple Vitamins-Minerals (MULTIVITAMIN MEN 50+) TABS Take 1 tablet by mouth daily      vitamin B-12 (CYANOCOBALAMIN) 500 MCG tablet Take 1 tablet by mouth daily      b complex vitamins capsule Take 1 capsule by mouth daily      Garlic 1000 MG CAPS Take 1 tablet by mouth daily (Patient not taking: Reported on 7/31/2023)       No current facility-administered medications for this visit.     /76 (Site: Right Upper Arm, Position: Sitting)   Pulse 78   Wt 84.3 kg (185 lb 12.8 oz)   SpO2 98%   BMI 29.99 kg/m²   Physical Exam:  Mental Status

## 2024-10-04 LAB — LACOSAMIDE: 15.4 MCG/ML

## 2024-12-26 DIAGNOSIS — R56.9 SEIZURE (HCC): ICD-10-CM

## 2024-12-26 DIAGNOSIS — G40.409 GRAND MAL SEIZURE DISORDER (HCC): ICD-10-CM

## 2024-12-26 NOTE — TELEPHONE ENCOUNTER
Patient needs a refill on   Brivaracetam (BRIVIACT) 50 MG tablet .       Pharmacy:   [id number] [place] [id number] - P [phone] - F [phone] [[id number]]

## 2025-02-28 DIAGNOSIS — R56.9 SEIZURE (HCC): ICD-10-CM

## 2025-02-28 RX ORDER — DIVALPROEX SODIUM 500 MG/1
500 TABLET, FILM COATED, EXTENDED RELEASE ORAL 2 TIMES DAILY
Qty: 60 TABLET | Refills: 5 | OUTPATIENT
Start: 2025-02-28

## 2025-03-04 DIAGNOSIS — R56.9 SEIZURE (HCC): ICD-10-CM

## 2025-03-04 NOTE — TELEPHONE ENCOUNTER
Last Visit: 10/2/24    No follow up on file.     Rx is pending.     Requested Prescriptions     Pending Prescriptions Disp Refills    lacosamide (VIMPAT) 200 MG tablet [Pharmacy Med Name: lacosamide 200 mg tablet] 60 tablet 5     Sig: Take 1 tablet by mouth 2 times daily.

## 2025-03-05 RX ORDER — LACOSAMIDE 200 MG/1
200 TABLET ORAL 2 TIMES DAILY
Qty: 60 TABLET | Refills: 2 | Status: SHIPPED | OUTPATIENT
Start: 2025-03-05 | End: 2025-09-05

## 2025-03-20 ENCOUNTER — OFFICE VISIT (OUTPATIENT)
Age: 60
End: 2025-03-20

## 2025-03-20 VITALS
DIASTOLIC BLOOD PRESSURE: 74 MMHG | BODY MASS INDEX: 29.89 KG/M2 | HEART RATE: 70 BPM | OXYGEN SATURATION: 97 % | WEIGHT: 185.2 LBS | SYSTOLIC BLOOD PRESSURE: 136 MMHG

## 2025-03-20 DIAGNOSIS — R56.9 SEIZURE (HCC): ICD-10-CM

## 2025-03-20 DIAGNOSIS — G40.409 GRAND MAL SEIZURE DISORDER (HCC): ICD-10-CM

## 2025-03-20 RX ORDER — DIVALPROEX SODIUM 500 MG/1
500 TABLET, FILM COATED, EXTENDED RELEASE ORAL 2 TIMES DAILY
Qty: 60 TABLET | Refills: 5 | Status: SHIPPED | OUTPATIENT
Start: 2025-03-20

## 2025-03-20 NOTE — PROGRESS NOTES
dysarthria moderate  Cranial Nerves              Eyes: pupils normal size and reactive to light and visual fields appear full              CN III, IV, VI : extraocular muscle strength normal, right eye delayed with tracking, no nystagmus and no ptosis              Facial Motor: normal facial motor              CN XII: tongue protrudes midline  Motor/Coordination Exam              Power: motor strength appears intact throughout, no arm drift and normal tone              Coordination: forearm rotation intact, dysmetria EU right and dysdiadochokinesia right  Sensory Exam No Bradykinesia, No Dyskindesia, No myoclonus, Normal strength, Normal Tone Normal bulk and Normal tone tremulous right upper extremity essential tremor with mild resting tremor, very mild left upper extremity resting tremor              Gait/Posture: station normal   Assessment and Plan     Diagnosis Orders   1. Seizure (HCC)  divalproex (DEPAKOTE ER) 500 MG extended release tablet    Brivaracetam (BRIVIACT) 50 MG tablet    LACOSAMIDE LEVEL    Valproic Acid Level, Total and Free    Comprehensive Metabolic Panel    CBC with Auto Differential    DISCONTINUED: Brivaracetam (BRIVIACT) 50 MG tablet      2. Grand mal seizure disorder (HCC)  Brivaracetam (BRIVIACT) 50 MG tablet    DISCONTINUED: Brivaracetam (BRIVIACT) 50 MG tablet      Cedric was seen in neurological follow up in regards to seizure disorder.He remains on Vimpat 200 mg in the morning and in the evening, Depakote 500 mg twice daily,Since adding Briviact, his seizures have been well-controlled.  Refills provided.  Labs were all normal    Return in about 6 months (around 9/20/2025).    Michoacano Lucas, APRN - CNP

## 2025-06-30 DIAGNOSIS — R56.9 SEIZURE (HCC): ICD-10-CM

## 2025-06-30 NOTE — TELEPHONE ENCOUNTER
Patient needs a refill on   lacosamide (VIMPAT) 200 MG table    Pharmacy:   [id number] - [place] [id number] - P [phone] - F [phone] [[id number]]

## 2025-07-01 RX ORDER — LACOSAMIDE 200 MG/1
200 TABLET ORAL 2 TIMES DAILY
Qty: 60 TABLET | Refills: 5 | Status: SHIPPED | OUTPATIENT
Start: 2025-07-01 | End: 2026-07-01

## (undated) DEVICE — Z DISCONTINUED (USE MFG CAT MVABO)  TUBING GAS SAMPLING STD 6.5 FT FEMALE CONN SMRT CAPNOLINE

## (undated) DEVICE — LINER SUCT CANSTR 1500CC SEMI RIG W/ POR HYDROPHOBIC SHUT

## (undated) DEVICE — BRUSH CLN DIA5MM NYL SGL END CBL ASST FLEX DSTL TIP POLYPR

## (undated) DEVICE — TUBING, SUCTION, 3/16" X 6', STRAIGHT: Brand: MEDLINE

## (undated) DEVICE — JELLY LUBRICATING 3 GM BACTERIOSTATIC

## (undated) DEVICE — TUBING, SUCTION, 9/32" X 10', STRAIGHT: Brand: MEDLINE

## (undated) DEVICE — YANKAUER,FLEXIBLE HANDLE,REGLR CAPACITY: Brand: MEDLINE INDUSTRIES, INC.